# Patient Record
Sex: FEMALE | Race: WHITE | NOT HISPANIC OR LATINO | Employment: PART TIME | URBAN - METROPOLITAN AREA
[De-identification: names, ages, dates, MRNs, and addresses within clinical notes are randomized per-mention and may not be internally consistent; named-entity substitution may affect disease eponyms.]

---

## 2018-10-09 ENCOUNTER — OFFICE VISIT (OUTPATIENT)
Dept: FAMILY MEDICINE CLINIC | Facility: CLINIC | Age: 58
End: 2018-10-09
Payer: COMMERCIAL

## 2018-10-09 VITALS
BODY MASS INDEX: 32.03 KG/M2 | HEART RATE: 82 BPM | WEIGHT: 180.75 LBS | DIASTOLIC BLOOD PRESSURE: 70 MMHG | SYSTOLIC BLOOD PRESSURE: 102 MMHG | OXYGEN SATURATION: 96 % | HEIGHT: 63 IN | TEMPERATURE: 98 F

## 2018-10-09 DIAGNOSIS — Z23 NEED FOR INFLUENZA VACCINATION: ICD-10-CM

## 2018-10-09 DIAGNOSIS — Z13.220 SCREENING, LIPID: ICD-10-CM

## 2018-10-09 DIAGNOSIS — Z00.00 ROUTINE ADULT HEALTH MAINTENANCE: Primary | ICD-10-CM

## 2018-10-09 DIAGNOSIS — Z13.29 SCREENING FOR THYROID DISORDER: ICD-10-CM

## 2018-10-09 DIAGNOSIS — Z13.1 SCREENING FOR DIABETES MELLITUS: ICD-10-CM

## 2018-10-09 DIAGNOSIS — Z12.39 BREAST CANCER SCREENING: ICD-10-CM

## 2018-10-09 DIAGNOSIS — Z13.0 SCREENING, ANEMIA, DEFICIENCY, IRON: ICD-10-CM

## 2018-10-09 DIAGNOSIS — Z12.11 COLON CANCER SCREENING: ICD-10-CM

## 2018-10-09 PROCEDURE — 90471 IMMUNIZATION ADMIN: CPT

## 2018-10-09 PROCEDURE — 90682 RIV4 VACC RECOMBINANT DNA IM: CPT

## 2018-10-09 PROCEDURE — 99386 PREV VISIT NEW AGE 40-64: CPT | Performed by: FAMILY MEDICINE

## 2018-10-09 RX ORDER — BUTALBITAL, ACETAMINOPHEN AND CAFFEINE 50; 325; 40 MG/1; MG/1; MG/1
1-2 TABLET ORAL EVERY 4 HOURS PRN
COMMUNITY
Start: 2012-12-28 | End: 2018-10-09

## 2018-10-09 RX ORDER — ACETAMINOPHEN AND CODEINE PHOSPHATE 300; 30 MG/1; MG/1
1 TABLET ORAL
COMMUNITY
Start: 2013-08-09 | End: 2018-10-09

## 2018-10-09 NOTE — PATIENT INSTRUCTIONS

## 2018-10-09 NOTE — PROGRESS NOTES
Subjective     Kevin Adams is a 66 y o   female and is here for routine health maintenance  The patient reports no problems  History of Present Illness     HPI    Well Adult Physical   Patient here for a comprehensive physical exam       Diet and Physical Activity  Diet: well balanced diet  Weight concerns: Patient has class 1 obesity (BMI 30-34  9)  Exercise: infrequently      Depression Screen  PHQ-9 Depression Screening    PHQ-9:    Frequency of the following problems over the past two weeks:       Little interest or pleasure in doing things:  0 - not at all  Feeling down, depressed, or hopeless:  0 - not at all  PHQ-2 Score:  0          General Health  Hearing: Normal:  bilateral  Vision: no vision problems  Dental: regular dental visits and brushes teeth twice daily     History:  Menopause at 48 years  : 2  Para: 2    Cancer Screening  Colononoscopy - never had one, no fam hx  Mammogram - last one was years ago, poor experience   Pap - many years ago, at least 4  Abnormal pap? no      The following portions of the patient's history were reviewed and updated as appropriate: allergies, current medications, past family history, past medical history, past social history, past surgical history and problem list     Review of Systems     Review of Systems   Constitutional: Negative for chills, fever and unexpected weight change  HENT: Negative for congestion, ear pain, hearing loss, postnasal drip, rhinorrhea and sore throat  Eyes: Negative for visual disturbance  Respiratory: Negative for cough and shortness of breath  Cardiovascular: Negative for chest pain  Gastrointestinal: Negative for abdominal pain, constipation and diarrhea  Genitourinary: Negative for difficulty urinating  Musculoskeletal: Negative for arthralgias and gait problem  Skin: Negative for rash  Neurological: Negative for light-headedness and headaches     Psychiatric/Behavioral: Negative for dysphoric mood and sleep disturbance  The patient is not nervous/anxious  Past Medical History     Past Medical History:   Diagnosis Date    Depression        Past Surgical History     Past Surgical History:   Procedure Laterality Date    NO PAST SURGERIES         Social History     Social History     Social History    Marital status: /Civil Union     Spouse name: N/A    Number of children: N/A    Years of education: N/A     Social History Main Topics    Smoking status: Never Smoker    Smokeless tobacco: Never Used    Alcohol use None    Drug use: Unknown    Sexual activity: Not Asked     Other Topics Concern    None     Social History Narrative    None       Family History     Family History   Problem Relation Age of Onset    Asthma Mother     Heart disease Mother     Diabetes Father         Mellitus    Pancreatic cancer Father        Current Medications     No current outpatient prescriptions on file  Allergies     No Known Allergies    Objective     /70   Pulse 82   Temp 98 °F (36 7 °C)   Ht 5' 2 5" (1 588 m)   Wt 82 kg (180 lb 12 oz)   SpO2 96%   BMI 32 53 kg/m²   Wt Readings from Last 3 Encounters:   10/09/18 82 kg (180 lb 12 oz)   08/09/13 77 8 kg (171 lb 8 oz)   12/28/12 79 4 kg (175 lb)     BP Readings from Last 3 Encounters:   10/09/18 102/70   08/09/13 101/80   12/28/12 115/70     Pulse Readings from Last 3 Encounters:   10/09/18 82   08/09/13 80     Body mass index is 32 53 kg/m²  Physical Exam   Constitutional: She is oriented to person, place, and time  She appears well-developed and well-nourished  HENT:   Head: Normocephalic and atraumatic  Right Ear: External ear normal    Left Ear: External ear normal    Nose: Nose normal    Mouth/Throat: Oropharynx is clear and moist    Eyes: Pupils are equal, round, and reactive to light  Conjunctivae and EOM are normal    Neck: Normal range of motion  Neck supple  No thyroid mass and no thyromegaly present     Cardiovascular: Normal rate, regular rhythm and normal heart sounds  Pulmonary/Chest: Effort normal and breath sounds normal    Abdominal: Soft  Normal appearance  There is no tenderness  Musculoskeletal: Normal range of motion  She exhibits no edema  Lymphadenopathy:     She has no cervical adenopathy  Right: No supraclavicular adenopathy present  Neurological: She is alert and oriented to person, place, and time  Skin: Skin is warm, dry and intact  Psychiatric: She has a normal mood and affect  Her behavior is normal  Judgment and thought content normal    Nursing note and vitals reviewed  No exam data present    Health Maintenance     Health Maintenance   Topic Date Due    CRC Screening: Colonoscopy  1960    PAP SMEAR  01/03/1983    MAMMOGRAM  07/06/2014    Depression Screening PHQ  10/09/2019    DTaP,Tdap,and Td Vaccines (2 - Td) 08/09/2023    INFLUENZA VACCINE  Completed     Immunization History   Administered Date(s) Administered    H1N1, All Formulations 01/14/2010    Influenza, recombinant, quadrivalent,injectable, preservative free 10/09/2018    Tdap 08/09/2013                     Assessment/Plan         1  Healthy female exam   2  Patient Counseling:   · Nutrition: Stressed importance of a well balanced diet, moderation of sodium/saturated fat, caloric balance and sufficient intake of fiber  · Exercise: Stressed the importance of regular exercise with a goal of 150 minutes per week  · Dental Health: Discussed daily flossing and brushing and regular dental visits     · Immunizations reviewed  · Discussed benefits of screening   · Discussed the patient's BMI with her  The BMI is above average - the patient is generally eating a healthy diet but does not exercise much given her lack of time  I did encourage exercise and healthy diet  3  Cancer Screening - not UTD - needs pap, colonoscopy and mammo    She was given orders for colonoscopy and mammogram and I did advise she make an appointment for a Pap either with me or an OBGYN  4  Labs - ordered, also overdue  5    The patient has not been seen by any doctor in many years  She had a very poor experience with a mammogram in 2013 in which she had a biopsy that was done by a radiologist at [facility] and she was having a very hard time getting information or results from anybody  After that she basically stop seen doctors in general   She never went back to her OBGYN or came to see us here  She is overdue for all routine care because of this  I am hoping we can make this a more pleasant experience for her and advised that she get her mammograms her [facility] at which time I will see the results quickly and let her know what they are   6  Follow up next physical in 1 year      Dionisoi Mariee MD

## 2018-11-09 ENCOUNTER — OFFICE VISIT (OUTPATIENT)
Dept: FAMILY MEDICINE CLINIC | Facility: CLINIC | Age: 58
End: 2018-11-09
Payer: COMMERCIAL

## 2018-11-09 VITALS
DIASTOLIC BLOOD PRESSURE: 78 MMHG | HEART RATE: 94 BPM | WEIGHT: 180.5 LBS | HEIGHT: 63 IN | TEMPERATURE: 98 F | BODY MASS INDEX: 31.98 KG/M2 | SYSTOLIC BLOOD PRESSURE: 104 MMHG | OXYGEN SATURATION: 98 %

## 2018-11-09 DIAGNOSIS — F41.9 ANXIETY: ICD-10-CM

## 2018-11-09 DIAGNOSIS — Z12.11 COLON CANCER SCREENING: ICD-10-CM

## 2018-11-09 DIAGNOSIS — E03.9 ACQUIRED HYPOTHYROIDISM: ICD-10-CM

## 2018-11-09 DIAGNOSIS — F51.5 NIGHTMARES: Primary | ICD-10-CM

## 2018-11-09 PROCEDURE — 1036F TOBACCO NON-USER: CPT | Performed by: FAMILY MEDICINE

## 2018-11-09 PROCEDURE — 3008F BODY MASS INDEX DOCD: CPT | Performed by: FAMILY MEDICINE

## 2018-11-09 PROCEDURE — 99214 OFFICE O/P EST MOD 30 MIN: CPT | Performed by: FAMILY MEDICINE

## 2018-11-09 RX ORDER — LEVOTHYROXINE SODIUM 0.03 MG/1
25 TABLET ORAL DAILY
Qty: 30 TABLET | Refills: 1 | Status: SHIPPED | OUTPATIENT
Start: 2018-11-09 | End: 2018-12-23 | Stop reason: SDUPTHER

## 2018-11-09 RX ORDER — LORAZEPAM 0.5 MG/1
0.5 TABLET ORAL EVERY 8 HOURS PRN
Qty: 20 TABLET | Refills: 0 | Status: SHIPPED | OUTPATIENT
Start: 2018-11-09 | End: 2019-10-22 | Stop reason: CLARIF

## 2018-11-09 NOTE — ASSESSMENT & PLAN NOTE
This is a new diagnosis for the patient  I am going to go ahead and start her on levothyroxine 25 mcg  We will recheck the TSH in six weeks and adjust the dose as needed

## 2018-11-09 NOTE — ASSESSMENT & PLAN NOTE
I am absolutely writing a letter for the patient to be excused from jury duty  I definitely feel that this would have a significantly bad effect on her mental health  It sounds like she is almost suffering from PTSD from her experience and this would most certainly worsen with another Federal case  In addition, I am prescribing her some lorazepam to take if she is having trouble sleeping at night or if she has nightmares/panic attacks  More than likely when she know she does not have to partake in jury duty a lot of her anxiety should hopefully resolve

## 2018-11-09 NOTE — PATIENT INSTRUCTIONS
Breathing Techniques   WHAT YOU NEED TO KNOW:   Breathing techniques help slow your breathing and lower anxiety  These techniques help you breathe in more oxygen and breathe out carbon dioxide  Breathing techniques also help improve the function of your diaphragm and other muscles used for breathing  Practice breathing techniques at least 4 times per day  Practice them lying down, sitting, and walking  DISCHARGE INSTRUCTIONS:   Pursed-lip breathing:  Pursed-lip breathing is used to slow down your breathing rate  Breathe in through your nose for 2 seconds  Slowly breathe out through your mouth with your lips pursed for 4 seconds  Breathing out for 4 seconds helps get rid of carbon dioxide  You can also practice this breathing pattern while you bend, lift, climb stairs, or exercise  It slows down your breathing and helps move more air in and out of your lungs  · Pursed-lip breathing while walking:  Inhale while taking 2 steps  Exhale through pursed lips while taking 4 steps  Stop and rest if you become short of breath  · Pursed-lip breathing while climbing stairs:  Inhale through your nose while standing still  Exhale through pursed lips while climbing 1 or 2 stairs  Repeat until you are at the top  Stop and rest if you become short of breath  Abdominal breathing:  Abdominal breathing is also called diaphragmatic breathing  This type of breathing helps control your breathing and strengthen your diaphragm  Your diaphragm is the main muscle you use when you breathe  You will be able to do this type of breathing during activities with practice  · Lie on your back with pillows under your head and knees  If you cannot lie on your back, try sitting up in a chair  · Put one hand on your abdomen and one on your upper chest      · Breathe in slowly through your nose  Your abdomen should move out, and your chest should be as still as possible       · Tighten, or pull, your stomach muscles in toward your back as you breathe out slowly through pursed lips  Your chest should continue to remain as still as possible  Tips for breathing easier:   · If prescribed, use oxygen as directed  You may need to use it when you do activities such as bathing, eating, and dressing  · Sleep with your head elevated with many pillows or sleep in a recliner  · Lean forward while sitting with your elbows on a table or on your knees  · Relax by listening to soft music, getting a massage, or imagining a quiet place  · Continue physical activity for strength and endurance  Learn how to plan your activities to conserve your energy  © 2017 [employer]  Information is for End User's use only and may not be sold, redistributed or otherwise used for commercial purposes  All illustrations and images included in CareNotes® are the copyrighted property of [employer]  or [employer]  The above information is an  only  It is not intended as medical advice for individual conditions or treatments  Talk to your doctor, nurse or pharmacist before following any medical regimen to see if it is safe and effective for you  Hypothyroidism   AMBULATORY CARE:   Hypothyroidism  is a condition that develops when the thyroid gland does not make enough thyroid hormone  Thyroid hormones help control body temperature, heart rate, growth, and weight  Common signs and symptoms include the following: The signs and symptoms may develop slowly, sometimes over several years  · Exhaustion    · Sensitivity to cold    · Headaches or decreased concentration    · Muscle aches or weakness    · Constipation     · Dry, flaky skin or brittle nails    · Thinning hair    · Heavy or irregular monthly periods    · Depression or irritability  Call 911 for any of the following:   · You have sudden chest pain or shortness of breath  · You have a seizure  · You feel like you are going to faint    Seek care immediately if:   · You have diarrhea, tremors, or trouble sleeping  · Your legs, ankles, or feet are swollen  Contact your healthcare provider if:   · You have a fever  · You have chills, a cough, or feel weak and achy  · You have pain and swelling in your muscles and joints  · Your skin is itchy, swollen, or you have a rash  · Your signs and symptoms return or get worse, even after treatment  · You have questions or concerns about your condition or care  Treatment:  Thyroid hormone replacement medicine may bring your thyroid hormone level back to normal  Ask your healthcare provider for more information on other medicines you may need  Follow up with your healthcare provider as directed: You may need to return for more blood tests to check your thyroid hormone level  This will show if you are getting the right amount of thyroid medicine  Write down your questions so you remember to ask them during your visits  © 2017 [employer]  Information is for End User's use only and may not be sold, redistributed or otherwise used for commercial purposes  All illustrations and images included in CareNotes® are the copyrighted property of [employer]  or [employer]  The above information is an  only  It is not intended as medical advice for individual conditions or treatments  Talk to your doctor, nurse or pharmacist before following any medical regimen to see if it is safe and effective for you

## 2018-11-09 NOTE — PROGRESS NOTES
Subjective:   Chief Complaint   Patient presents with    Insomnia     PT RECEIVED FEDERAL JURY DUTY NOTICE FOR END OF NOVEMBER  PT HAD BAD EXPERIENCE AND NIGHTMARES AFTER  BEING INVOLVED IN A CASE IN THE PAST   PT FEELS SHE IS UNABLE TO DO THIS AGAIN  PT HAVING NIGHTMARES   PT HAD BLOODWORK DONE YESTERDAY  MAMMO SCHEDULED FOR NEXT WEEK  Patient ID: Stella Manzano is a 66 y o  female  The patient is here today because she is hoping I will write her a note for Federal jury duty  This is the 2nd time that she has been called  The last time she was actually called for a case that involved child trafficking  During this case she felt that the suspect was treated better than the yours  She was school did by the , advised she needed to put her feelings as a mother aside  She has significant stress and anxiety because of the time she spent on this Federal jury  Since she got the letter that she was called again she has been waking up with nightmares every night   she is having panic attacks  She has a history of anxiety but is not on medication    Additionally, she had lab work done yesterday  We receive most of the lab results  Everything is normal except for an elevated TSH  She does report fatigue  She does report that she is working to lose weight nothing is coming off  She does say she is generally not been feeling well lately  She has no personal or family history of thyroid disease as far she knows        The following portions of the patient's history were reviewed and updated as appropriate: allergies, current medications, past family history, past medical history, past social history, past surgical history and problem list     Review of Systems      Objective:  Vitals:    11/09/18 1334   BP: 104/78   Pulse: 94   Temp: 98 °F (36 7 °C)   SpO2: 98%   Weight: 81 9 kg (180 lb 8 oz)   Height: 5' 2 5" (1 588 m)      Physical Exam   Constitutional: She is oriented to person, place, and time  She appears well-developed and well-nourished  No distress  Neck: Normal range of motion  Neck supple  No thyromegaly present  Neurological: She is alert and oriented to person, place, and time  No cranial nerve deficit  Coordination normal    Skin: Skin is warm and dry  No rash noted  She is not diaphoretic  No erythema  Psychiatric: Her behavior is normal  Judgment and thought content normal  Her mood appears anxious  Her affect is blunt  Her affect is not labile and not inappropriate  Assessment/Plan:    Acquired hypothyroidism  This is a new diagnosis for the patient  I am going to go ahead and start her on levothyroxine 25 mcg  We will recheck the TSH in six weeks and adjust the dose as needed  Anxiety  I am absolutely writing a letter for the patient to be excused from jury duty  I definitely feel that this would have a significantly bad effect on her mental health  It sounds like she is almost suffering from PTSD from her experience and this would most certainly worsen with another Federal case  In addition, I am prescribing her some lorazepam to take if she is having trouble sleeping at night or if she has nightmares/panic attacks  More than likely when she know she does not have to partake in jury duty a lot of her anxiety should hopefully resolve  Diagnoses and all orders for this visit:    Nightmares  -     LORazepam (ATIVAN) 0 5 mg tablet; Take 1 tablet (0 5 mg total) by mouth every 8 (eight) hours as needed for anxiety    Anxiety  -     LORazepam (ATIVAN) 0 5 mg tablet; Take 1 tablet (0 5 mg total) by mouth every 8 (eight) hours as needed for anxiety    Acquired hypothyroidism  -     levothyroxine 25 mcg tablet; Take 1 tablet (25 mcg total) by mouth daily  -     TSH, 3rd generation with Free T4 reflex;  Future    Colon cancer screening

## 2018-11-09 NOTE — LETTER
Date: 11/9/2018    To whom it may concern: This is to certify that Judson Lovell has been under my care for the following diagnosis: PTSD/Anxiety    I feel that she is unable to serve on [address] at this time for the above mentioned medical reasons        Participant #[id number]        Sincerely,   Christianne Mario MD

## 2018-11-10 LAB
ALBUMIN SERPL-MCNC: 4.8 G/DL (ref 3.5–5.5)
ALBUMIN/GLOB SERPL: 1.7 {RATIO} (ref 1.2–2.2)
ALP SERPL-CCNC: 104 IU/L (ref 39–117)
ALT SERPL-CCNC: 17 IU/L (ref 0–32)
AST SERPL-CCNC: 22 IU/L (ref 0–40)
BASOPHILS # BLD AUTO: 0 X10E3/UL (ref 0–0.2)
BASOPHILS NFR BLD AUTO: 1 %
BILIRUB SERPL-MCNC: 0.4 MG/DL (ref 0–1.2)
BUN SERPL-MCNC: 11 MG/DL (ref 6–24)
BUN/CREAT SERPL: 14 (ref 9–23)
CALCIUM SERPL-MCNC: 9.7 MG/DL (ref 8.7–10.2)
CHLORIDE SERPL-SCNC: 104 MMOL/L (ref 96–106)
CHOLEST SERPL-MCNC: 167 MG/DL (ref 100–199)
CHOLEST/HDLC SERPL: 3 RATIO (ref 0–4.4)
CO2 SERPL-SCNC: 22 MMOL/L (ref 20–29)
CREAT SERPL-MCNC: 0.79 MG/DL (ref 0.57–1)
EOSINOPHIL # BLD AUTO: 0.1 X10E3/UL (ref 0–0.4)
EOSINOPHIL NFR BLD AUTO: 1 %
ERYTHROCYTE [DISTWIDTH] IN BLOOD BY AUTOMATED COUNT: 13.4 % (ref 12.3–15.4)
GLOBULIN SER-MCNC: 2.8 G/DL (ref 1.5–4.5)
GLUCOSE SERPL-MCNC: 97 MG/DL (ref 65–99)
HCT VFR BLD AUTO: 41.2 % (ref 34–46.6)
HDLC SERPL-MCNC: 55 MG/DL
HGB BLD-MCNC: 14.2 G/DL (ref 11.1–15.9)
IMM GRANULOCYTES # BLD: 0 X10E3/UL (ref 0–0.1)
IMM GRANULOCYTES NFR BLD: 0 %
LDLC SERPL CALC-MCNC: 92 MG/DL (ref 0–99)
LYMPHOCYTES # BLD AUTO: 1.8 X10E3/UL (ref 0.7–3.1)
LYMPHOCYTES NFR BLD AUTO: 33 %
MCH RBC QN AUTO: 31.6 PG (ref 26.6–33)
MCHC RBC AUTO-ENTMCNC: 34.5 G/DL (ref 31.5–35.7)
MCV RBC AUTO: 92 FL (ref 79–97)
MONOCYTES # BLD AUTO: 0.4 X10E3/UL (ref 0.1–0.9)
MONOCYTES NFR BLD AUTO: 8 %
NEUTROPHILS # BLD AUTO: 3.2 X10E3/UL (ref 1.4–7)
NEUTROPHILS NFR BLD AUTO: 57 %
PLATELET # BLD AUTO: 204 X10E3/UL (ref 150–379)
POTASSIUM SERPL-SCNC: 4.1 MMOL/L (ref 3.5–5.2)
PROT SERPL-MCNC: 7.6 G/DL (ref 6–8.5)
RBC # BLD AUTO: 4.49 X10E6/UL (ref 3.77–5.28)
SL AMB EGFR AFRICAN AMERICAN: 97 ML/MIN/1.73
SL AMB EGFR NON AFRICAN AMERICAN: 84 ML/MIN/1.73
SL AMB T4, FREE (DIRECT): 1.03 NG/DL (ref 0.82–1.77)
SL AMB VLDL CHOLESTEROL CALC: 20 MG/DL (ref 5–40)
SODIUM SERPL-SCNC: 141 MMOL/L (ref 134–144)
TRIGL SERPL-MCNC: 100 MG/DL (ref 0–149)
TSH SERPL DL<=0.005 MIU/L-ACNC: 9.87 UIU/ML (ref 0.45–4.5)
WBC # BLD AUTO: 5.6 X10E3/UL (ref 3.4–10.8)

## 2018-12-20 ENCOUNTER — TELEPHONE (OUTPATIENT)
Dept: OTHER | Facility: OTHER | Age: 58
End: 2018-12-20

## 2018-12-20 ENCOUNTER — TELEPHONE (OUTPATIENT)
Dept: FAMILY MEDICINE CLINIC | Facility: CLINIC | Age: 58
End: 2018-12-20

## 2018-12-20 DIAGNOSIS — E03.9 HYPOTHYROIDISM, UNSPECIFIED TYPE: Primary | ICD-10-CM

## 2018-12-20 NOTE — TELEPHONE ENCOUNTER
Patient calmed herself down and called back  She is okay now  She called her insurance and they told [facility], so [facility]  One of her issues [facility] her so she does not want to go back there  She is going to come  a hard copy of the script today to make sure there are no issues this time  I believe this issue may be resolved for now

## 2018-12-20 NOTE — TELEPHONE ENCOUNTER
Received tearful and angry phone call this morning from the patient  She is extremely upset stating that she went to [facility] this morning to have her TSH level drawn and that they stated they did not have her orders and refused to draw her blood  Patient also stated that she has felt much worse ever since taking the thyroid medication  I tried to calm the patient down, but she just became more frustrated and stated that she is d/c her thyroid medicine, she is not getting her blood work, and she is not coming back to our office because she feels that this is all too much for her to handle and feels that our office should be doing all the footwork for her because she doesn't have time  I did offer the patient several options to try and help but she refused all of them  This is just an Citizen of [place]  Please advise if you would like anything done further with this patient

## 2018-12-20 NOTE — TELEPHONE ENCOUNTER
Robel I am not sure if you want to reach out to this patient or not  I had ordered a repeat TSH but it looks like it was ordered to [address], clearly my mistake  I wish they would have called our office as it could have been put in while she was there  She has a lot of anxiety - I would be happy to see her in the office to discuss this all if she would like

## 2018-12-23 DIAGNOSIS — E03.9 ACQUIRED HYPOTHYROIDISM: ICD-10-CM

## 2018-12-23 LAB — TSH SERPL-ACNC: 4.26 MIU/L (ref 0.4–4.5)

## 2018-12-24 RX ORDER — LEVOTHYROXINE SODIUM 0.03 MG/1
25 TABLET ORAL DAILY
Qty: 30 TABLET | Refills: 0 | Status: SHIPPED | OUTPATIENT
Start: 2018-12-24 | End: 2018-12-27 | Stop reason: SDUPTHER

## 2018-12-24 NOTE — TELEPHONE ENCOUNTER
From: Rock Almaraz  Sent: 12/23/2018 9:27 AM EST  Subject: Medication Renewal Request    Rock Almaraz would like a refill of the following medications:     levothyroxine 25 mcg tablet Scott Nunez MD]    Preferred pharmacy: [facility]/PHARMACY #[id number] : [id number]

## 2018-12-27 DIAGNOSIS — E03.9 ACQUIRED HYPOTHYROIDISM: ICD-10-CM

## 2018-12-27 RX ORDER — LEVOTHYROXINE SODIUM 0.03 MG/1
25 TABLET ORAL DAILY
Qty: 30 TABLET | Refills: 0 | Status: SHIPPED | OUTPATIENT
Start: 2018-12-27 | End: 2018-12-31 | Stop reason: SDUPTHER

## 2018-12-31 DIAGNOSIS — E03.9 ACQUIRED HYPOTHYROIDISM: ICD-10-CM

## 2018-12-31 RX ORDER — LEVOTHYROXINE SODIUM 0.03 MG/1
25 TABLET ORAL DAILY
Qty: 90 TABLET | Refills: 3 | Status: SHIPPED | OUTPATIENT
Start: 2018-12-31 | End: 2019-10-22 | Stop reason: SDUPTHER

## 2018-12-31 NOTE — PROGRESS NOTES
Please call [facility] and let them know that we are cancelling the 30 day prescription of levothyroxine as she needs it sent to her mail order, which I did

## 2019-01-18 ENCOUNTER — HOSPITAL ENCOUNTER (OUTPATIENT)
Dept: MAMMOGRAPHY | Facility: CLINIC | Age: 59
Discharge: HOME | End: 2019-01-18

## 2019-01-22 ENCOUNTER — HOSPITAL ENCOUNTER (OUTPATIENT)
Dept: MAMMOGRAPHY | Facility: HOSPITAL | Age: 59
Discharge: HOME | End: 2019-01-22
Payer: COMMERCIAL

## 2019-01-22 VITALS — WEIGHT: 180 LBS | HEIGHT: 63 IN | BODY MASS INDEX: 31.89 KG/M2

## 2019-01-22 DIAGNOSIS — Z12.39 BREAST CANCER SCREENING: ICD-10-CM

## 2019-01-22 PROCEDURE — 77063 BREAST TOMOSYNTHESIS BI: CPT

## 2019-01-22 PROCEDURE — 77067 SCR MAMMO BI INCL CAD: CPT

## 2019-03-22 ENCOUNTER — TELEPHONE (OUTPATIENT)
Dept: FAMILY MEDICINE CLINIC | Facility: CLINIC | Age: 59
End: 2019-03-22

## 2019-03-22 NOTE — TELEPHONE ENCOUNTER
Called pt and asked her about the message she received  Pt states it was a message on [employer] that stated there was a referral in the system  I checked the pt's chart and there was no referral placed since October of 2018  Pt is going to disregard message

## 2019-03-22 NOTE — TELEPHONE ENCOUNTER
Regarding: Non-Urgent Medical Question  Contact: [phone]  ----- Message from [address] [place] [address] [address], Generic sent at 3/22/2019 12:14 PM EDT -----    Received letter from office regarding a referral and not sure what it is for as I have not contacted anyone   Please call my cellphone [phone]  Thanks,   Anna Shetty

## 2019-07-31 ENCOUNTER — TELEPHONE (OUTPATIENT)
Dept: FAMILY MEDICINE CLINIC | Facility: CLINIC | Age: 59
End: 2019-07-31

## 2019-07-31 NOTE — TELEPHONE ENCOUNTER
Please call the patient and schedule a routine physical/health maintenance visit any time after 10/9/2019

## 2019-09-30 ENCOUNTER — IMMUNIZATIONS (OUTPATIENT)
Dept: FAMILY MEDICINE CLINIC | Facility: CLINIC | Age: 59
End: 2019-09-30
Payer: COMMERCIAL

## 2019-09-30 DIAGNOSIS — Z23 ENCOUNTER FOR IMMUNIZATION: ICD-10-CM

## 2019-09-30 PROCEDURE — 90686 IIV4 VACC NO PRSV 0.5 ML IM: CPT

## 2019-09-30 PROCEDURE — 90471 IMMUNIZATION ADMIN: CPT

## 2019-10-22 ENCOUNTER — OFFICE VISIT (OUTPATIENT)
Dept: FAMILY MEDICINE CLINIC | Facility: CLINIC | Age: 59
End: 2019-10-22
Payer: COMMERCIAL

## 2019-10-22 VITALS
HEIGHT: 63 IN | WEIGHT: 175 LBS | DIASTOLIC BLOOD PRESSURE: 72 MMHG | TEMPERATURE: 97.6 F | SYSTOLIC BLOOD PRESSURE: 100 MMHG | OXYGEN SATURATION: 99 % | BODY MASS INDEX: 31.01 KG/M2 | HEART RATE: 85 BPM

## 2019-10-22 DIAGNOSIS — Z13.1 SCREENING FOR DIABETES MELLITUS: ICD-10-CM

## 2019-10-22 DIAGNOSIS — E66.9 OBESITY (BMI 30.0-34.9): ICD-10-CM

## 2019-10-22 DIAGNOSIS — Z13.220 SCREENING FOR LIPID DISORDERS: ICD-10-CM

## 2019-10-22 DIAGNOSIS — Z11.59 ENCOUNTER FOR HEPATITIS C SCREENING TEST FOR LOW RISK PATIENT: ICD-10-CM

## 2019-10-22 DIAGNOSIS — Z12.11 SCREEN FOR COLON CANCER: ICD-10-CM

## 2019-10-22 DIAGNOSIS — F51.5 NIGHTMARES: ICD-10-CM

## 2019-10-22 DIAGNOSIS — E03.9 ACQUIRED HYPOTHYROIDISM: Primary | ICD-10-CM

## 2019-10-22 DIAGNOSIS — Z13.0 SCREENING, ANEMIA, DEFICIENCY, IRON: ICD-10-CM

## 2019-10-22 DIAGNOSIS — Z12.39 SCREENING FOR BREAST CANCER: ICD-10-CM

## 2019-10-22 DIAGNOSIS — E03.9 ACQUIRED HYPOTHYROIDISM: ICD-10-CM

## 2019-10-22 PROCEDURE — 36415 COLL VENOUS BLD VENIPUNCTURE: CPT | Performed by: FAMILY MEDICINE

## 2019-10-22 PROCEDURE — 3008F BODY MASS INDEX DOCD: CPT | Performed by: FAMILY MEDICINE

## 2019-10-22 PROCEDURE — 99214 OFFICE O/P EST MOD 30 MIN: CPT | Performed by: FAMILY MEDICINE

## 2019-10-22 RX ORDER — LEVOTHYROXINE SODIUM 0.05 MG/1
25 TABLET ORAL DAILY
Qty: 90 TABLET | Refills: 0 | Status: SHIPPED | OUTPATIENT
Start: 2019-10-22 | End: 2019-10-23 | Stop reason: SDUPTHER

## 2019-10-22 NOTE — ASSESSMENT & PLAN NOTE
The patient continues with symptoms of hypothyroidism  I am going to increase her dosage of levothyroxine from 25 mcg to 50 mcg  As we did last year we discussed that generally people feel better with a TSH closer to 1 and her TSH was a little over 4 last December  She already had her blood drawn today to recheck the TSH  I am going to have everything rechecked in six weeks including all of her routine screening labs
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
## 2019-10-22 NOTE — PATIENT INSTRUCTIONS
Obesity   AMBULATORY CARE:   Obesity  is when your body mass index (BMI) is greater than 30  Your healthcare provider will use your height and weight to measure your BMI  The risks of obesity include  many health problems, such as injuries or physical disability  You may need tests to check for the following:  · Diabetes     · High blood pressure or high cholesterol     · Heart disease     · Gallbladder or liver disease     · Cancer of the colon, breast, prostate, liver, or kidney     · Sleep apnea     · Arthritis or gout  Seek care immediately if:   · You have a severe headache, confusion, or difficulty speaking  · You have weakness on one side of your body  · You have chest pain, sweating, or shortness of breath  Contact your healthcare provider if:   · You have symptoms of gallbladder or liver disease, such as pain in your upper abdomen  · You have knee or hip pain and discomfort while walking  · You have symptoms of diabetes, such as intense hunger and thirst, and frequent urination  · You have symptoms of sleep apnea, such as snoring or daytime sleepiness  · You have questions or concerns about your condition or care  Treatment for obesity  focuses on helping you lose weight to improve your health  Even a small decrease in BMI can reduce the risk for many health problems  Your healthcare provider will help you set a weight-loss goal   · Lifestyle changes  are the first step in treating obesity  These include making healthy food choices and getting regular physical activity  Your healthcare provider may suggest a weight-loss program that involves coaching, education, and therapy  · Medicine  may help you lose weight when it is used with a healthy diet and physical activity  · Surgery  can help you lose weight if you are very obese and have other health problems  There are several types of weight-loss surgery  Ask your healthcare provider for more information    Be successful losing weight:   · Set small, realistic goals  An example of a small goal is to walk for 20 minutes 5 days a week  Anther goal is to lose 5% of your body weight  · Tell friends, family members, and coworkers about your goals  and ask for their support  Ask a friend to lose weight with you, or join a weight-loss support group  · Identify foods or triggers that may cause you to overeat , and find ways to avoid them  Remove tempting high-calorie foods from your home and workplace  Place a bowl of fresh fruit on your kitchen counter  If stress causes you to eat, then find other ways to cope with stress  · Keep a diary to track what you eat and drink  Also write down how many minutes of physical activity you do each day  Weigh yourself once a week and record it in your diary  Eating changes: You will need to eat 500 to 1,000 fewer calories each day than you currently eat to lose 1 to 2 pounds a week  The following changes will help you cut calories:  · Eat smaller portions  Use small plates, no larger than 9 inches in diameter  Fill your plate half full of fruits and vegetables  Measure your food using measuring cups until you know what a serving size looks like  · Eat 3 meals and 1 or 2 snacks each day  Plan your meals in advance  Blair Christensen and eat at home most of the time  Eat slowly  · Eat fruits and vegetables at every meal   They are low in calories and high in fiber, which makes you feel full  Do not add butter, margarine, or cream sauce to vegetables  Use herbs to season steamed vegetables  · Eat less fat and fewer fried foods  Eat more baked or grilled chicken and fish  These protein sources are lower in calories and fat than red meat  Limit fast food  Dress your salads with olive oil and vinegar instead of bottled dressing  · Limit the amount of sugar you eat  Do not drink sugary beverages  Limit alcohol  Activity changes:  Physical activity is good for your body in many ways   It helps you burn calories and build strong muscles  It decreases stress and depression, and improves your mood  It can also help you sleep better  Talk to your healthcare provider before you begin an exercise program   · Exercise for at least 30 minutes 5 days a week  Start slowly  Set aside time each day for physical activity that you enjoy and that is convenient for you  It is best to do both weight training and an activity that increases your heart rate, such as walking, bicycling, or swimming  · Find ways to be more active  Do yard work and housecleaning  Walk up the stairs instead of using elevators  Spend your leisure time going to events that require walking, such as outdoor festivals or fairs  This extra physical activity can help you lose weight and keep it off  Follow up with your healthcare provider as directed: You may need to meet with a dietitian  Write down your questions so you remember to ask them during your visits  © 2017 [employer] Information is for End User's use only and may not be sold, redistributed or otherwise used for commercial purposes  All illustrations and images included in CareNotes® are the copyrighted property of [employer] M [employer]  or [employer]  The above information is an  only  It is not intended as medical advice for individual conditions or treatments  Talk to your doctor, nurse or pharmacist before following any medical regimen to see if it is safe and effective for you

## 2019-10-23 DIAGNOSIS — E03.9 ACQUIRED HYPOTHYROIDISM: ICD-10-CM

## 2019-10-23 LAB — TSH SERPL DL<=0.005 MIU/L-ACNC: 5.9 UIU/ML (ref 0.45–4.5)

## 2019-10-23 RX ORDER — LEVOTHYROXINE SODIUM 0.05 MG/1
50 TABLET ORAL DAILY
Qty: 90 TABLET | Refills: 0 | Status: SHIPPED | OUTPATIENT
Start: 2019-10-23 | End: 2019-12-27 | Stop reason: SDUPTHER

## 2019-10-23 RX ORDER — LEVOTHYROXINE SODIUM 0.03 MG/1
TABLET ORAL
Qty: 90 TABLET | Refills: 3 | OUTPATIENT
Start: 2019-10-23

## 2019-10-23 NOTE — TELEPHONE ENCOUNTER
Pt would like her levothyroxine sent to [facility], phone # is [phone] fax # [phone] ORDER # [id number], look like this med was called into [facility] yesterday

## 2019-11-26 ENCOUNTER — TELEPHONE (OUTPATIENT)
Dept: FAMILY MEDICINE CLINIC | Facility: CLINIC | Age: 59
End: 2019-11-26

## 2019-11-26 NOTE — TELEPHONE ENCOUNTER
----- Message from Beverly Mariano sent at 11/25/2019  5:33 PM EST -----  Regarding: FW: Test Results Question  Contact: [phone]      ----- Message -----  From: Michele Crespo  Sent: 11/22/2019   2:15 PM EST  To: [facility]  Subject: Test Results Question                            I have a question about COLOGUARD resulted on 11/13/19 at 11:08 AM  Please call me on my cellphone [phone]   Thanks, Germaine Johnson

## 2019-12-05 ENCOUNTER — CLINICAL SUPPORT (OUTPATIENT)
Dept: FAMILY MEDICINE CLINIC | Facility: CLINIC | Age: 59
End: 2019-12-05
Payer: COMMERCIAL

## 2019-12-05 DIAGNOSIS — Z13.1 SCREENING FOR DIABETES MELLITUS: ICD-10-CM

## 2019-12-05 DIAGNOSIS — Z13.0 SCREENING FOR ENDOCRINE, METABOLIC AND IMMUNITY DISORDER: ICD-10-CM

## 2019-12-05 DIAGNOSIS — Z13.0 SCREENING, ANEMIA, DEFICIENCY, IRON: ICD-10-CM

## 2019-12-05 DIAGNOSIS — E03.9 ACQUIRED HYPOTHYROIDISM: Primary | ICD-10-CM

## 2019-12-05 DIAGNOSIS — Z13.29 SCREENING FOR ENDOCRINE, METABOLIC AND IMMUNITY DISORDER: ICD-10-CM

## 2019-12-05 DIAGNOSIS — Z13.228 SCREENING FOR ENDOCRINE, METABOLIC AND IMMUNITY DISORDER: ICD-10-CM

## 2019-12-05 DIAGNOSIS — Z13.220 SCREENING FOR LIPID DISORDERS: ICD-10-CM

## 2019-12-05 PROCEDURE — 36415 COLL VENOUS BLD VENIPUNCTURE: CPT

## 2019-12-06 LAB
ALBUMIN SERPL-MCNC: 4.2 G/DL (ref 3.5–5.5)
ALBUMIN/GLOB SERPL: 1.6 {RATIO} (ref 1.2–2.2)
ALP SERPL-CCNC: 80 IU/L (ref 39–117)
ALT SERPL-CCNC: 15 IU/L (ref 0–32)
AST SERPL-CCNC: 15 IU/L (ref 0–40)
BASOPHILS # BLD AUTO: 0 X10E3/UL (ref 0–0.2)
BASOPHILS NFR BLD AUTO: 0 %
BILIRUB SERPL-MCNC: 0.4 MG/DL (ref 0–1.2)
BUN SERPL-MCNC: 13 MG/DL (ref 6–24)
BUN/CREAT SERPL: 20 (ref 9–23)
CALCIUM SERPL-MCNC: 9 MG/DL (ref 8.7–10.2)
CHLORIDE SERPL-SCNC: 106 MMOL/L (ref 96–106)
CHOLEST SERPL-MCNC: 147 MG/DL (ref 100–199)
CO2 SERPL-SCNC: 21 MMOL/L (ref 20–29)
CREAT SERPL-MCNC: 0.66 MG/DL (ref 0.57–1)
EOSINOPHIL # BLD AUTO: 0.1 X10E3/UL (ref 0–0.4)
EOSINOPHIL NFR BLD AUTO: 1 %
ERYTHROCYTE [DISTWIDTH] IN BLOOD BY AUTOMATED COUNT: 13.6 % (ref 12.3–15.4)
GLOBULIN SER-MCNC: 2.6 G/DL (ref 1.5–4.5)
GLUCOSE SERPL-MCNC: 95 MG/DL (ref 65–99)
HCT VFR BLD AUTO: 40 % (ref 34–46.6)
HDLC SERPL-MCNC: 53 MG/DL
HGB BLD-MCNC: 13.3 G/DL (ref 11.1–15.9)
IMM GRANULOCYTES # BLD: 0 X10E3/UL (ref 0–0.1)
IMM GRANULOCYTES NFR BLD: 0 %
LDLC SERPL CALC-MCNC: 79 MG/DL (ref 0–99)
LDLC/HDLC SERPL: 1.5 RATIO (ref 0–3.2)
LYMPHOCYTES # BLD AUTO: 1.5 X10E3/UL (ref 0.7–3.1)
LYMPHOCYTES NFR BLD AUTO: 20 %
MCH RBC QN AUTO: 31.1 PG (ref 26.6–33)
MCHC RBC AUTO-ENTMCNC: 33.3 G/DL (ref 31.5–35.7)
MCV RBC AUTO: 94 FL (ref 79–97)
MONOCYTES # BLD AUTO: 0.6 X10E3/UL (ref 0.1–0.9)
MONOCYTES NFR BLD AUTO: 8 %
NEUTROPHILS # BLD AUTO: 5.5 X10E3/UL (ref 1.4–7)
NEUTROPHILS NFR BLD AUTO: 71 %
PLATELET # BLD AUTO: 187 X10E3/UL (ref 150–450)
POTASSIUM SERPL-SCNC: 3.9 MMOL/L (ref 3.5–5.2)
PROT SERPL-MCNC: 6.8 G/DL (ref 6–8.5)
RBC # BLD AUTO: 4.28 X10E6/UL (ref 3.77–5.28)
SL AMB EGFR AFRICAN AMERICAN: 113 ML/MIN/1.73
SL AMB EGFR NON AFRICAN AMERICAN: 98 ML/MIN/1.73
SL AMB VLDL CHOLESTEROL CALC: 15 MG/DL (ref 5–40)
SODIUM SERPL-SCNC: 141 MMOL/L (ref 134–144)
TRIGL SERPL-MCNC: 77 MG/DL (ref 0–149)
TSH SERPL DL<=0.005 MIU/L-ACNC: 3.41 UIU/ML (ref 0.45–4.5)
WBC # BLD AUTO: 7.8 X10E3/UL (ref 3.4–10.8)

## 2019-12-27 DIAGNOSIS — E03.9 ACQUIRED HYPOTHYROIDISM: ICD-10-CM

## 2019-12-30 RX ORDER — LEVOTHYROXINE SODIUM 0.05 MG/1
TABLET ORAL
Qty: 90 TABLET | Refills: 0 | Status: SHIPPED | OUTPATIENT
Start: 2019-12-30 | End: 2020-01-22 | Stop reason: SDUPTHER

## 2020-01-02 ENCOUNTER — OFFICE VISIT (OUTPATIENT)
Dept: FAMILY MEDICINE CLINIC | Facility: CLINIC | Age: 60
End: 2020-01-02
Payer: COMMERCIAL

## 2020-01-02 VITALS
SYSTOLIC BLOOD PRESSURE: 102 MMHG | TEMPERATURE: 98.3 F | BODY MASS INDEX: 30.74 KG/M2 | HEIGHT: 63 IN | OXYGEN SATURATION: 98 % | DIASTOLIC BLOOD PRESSURE: 68 MMHG | HEART RATE: 87 BPM | WEIGHT: 173.5 LBS

## 2020-01-02 DIAGNOSIS — R05.8 POST-VIRAL COUGH SYNDROME: Primary | ICD-10-CM

## 2020-01-02 PROCEDURE — 99213 OFFICE O/P EST LOW 20 MIN: CPT | Performed by: FAMILY MEDICINE

## 2020-01-02 PROCEDURE — 3008F BODY MASS INDEX DOCD: CPT | Performed by: FAMILY MEDICINE

## 2020-01-02 RX ORDER — BENZONATATE 200 MG/1
200 CAPSULE ORAL 3 TIMES DAILY PRN
Qty: 30 CAPSULE | Refills: 0 | Status: SHIPPED | OUTPATIENT
Start: 2020-01-02 | End: 2021-05-21 | Stop reason: ALTCHOICE

## 2020-01-02 NOTE — PATIENT INSTRUCTIONS
Acute Bronchitis   WHAT YOU NEED TO KNOW:   Acute bronchitis is swelling and irritation in the air passages of your lungs  This irritation may cause you to cough or have other breathing problems  Acute bronchitis often starts because of another illness, such as a cold or the flu  The illness spreads from your nose and throat to your windpipe and airways  Bronchitis is often called a chest cold  Acute bronchitis lasts about 3 to 6 weeks and is usually not a serious illness  Your cough can last for several weeks  DISCHARGE INSTRUCTIONS:   Return to the emergency department if:   · You cough up blood  · Your lips or fingernails turn blue  · You feel like you are not getting enough air when you breathe  Contact your healthcare provider if:   · You have a fever  · Your breathing problems do not go away or get worse  · Your cough does not get better within 4 weeks  · You have questions or concerns about your condition or care  Self-care:   · Get more rest   Rest helps your body to heal  Slowly start to do more each day  Rest when you feel it is needed  · Avoid irritants in the air  Avoid chemicals, fumes, and dust  Wear a face mask if you must work around dust or fumes  Stay inside on days when air pollution levels are high  If you have allergies, stay inside when pollen counts are high  Do not use aerosol products, such as spray-on deodorant, bug spray, and hair spray  · Do not smoke or be around others who smoke  Nicotine and other chemicals in cigarettes and cigars damages the cilia that move mucus out of your lungs  Ask your healthcare provider for information if you currently smoke and need help to quit  E-cigarettes or smokeless tobacco still contain nicotine  Talk to your healthcare provider before you use these products  · Drink liquids as directed  Liquids help keep your air passages moist and help you cough up mucus   You may need to drink more liquids when you have acute bronchitis  Ask how much liquid to drink each day and which liquids are best for you  · Use a humidifier or vaporizer  Use a cool mist humidifier or a vaporizer to increase air moisture in your home  This may make it easier for you to breathe and help decrease your cough  Decrease risk for acute bronchitis:   · Get the vaccinations you need  Ask your healthcare provider if you should get vaccinated against the flu or pneumonia  · Prevent the spread of germs  You can decrease your risk of acute bronchitis and other illnesses by doing the following:     [facility] your hands often with soap and water  Carry germ-killing hand lotion or gel with you  You can use the lotion or gel to clean your hands when soap and water are not available  ¨ Do not touch your eyes, nose, or mouth unless you have washed your hands first     ¨ Always cover your mouth when you cough to prevent the spread of germs  It is best to cough into a tissue or your shirt sleeve instead of into your hand  Ask those around you cover their mouths when they cough  ¨ Try to avoid people who have a cold or the flu  If you are sick, stay away from others as much as possible  Medicines: Your healthcare provider may  give you any of the following:  · Ibuprofen or acetaminophen  are medicines that help lower your fever  They are available without a doctor's order  Ask your healthcare provider which medicine is right for you  Ask how much to take and how often to take it  Follow directions  These medicines can cause stomach bleeding if not taken correctly  Ibuprofen can cause kidney damage  Do not take ibuprofen if you have kidney disease, an ulcer, or allergies to aspirin  Acetaminophen can cause liver damage  Do not take more than 4,000 milligrams in 24 hours  · Decongestants  help loosen mucus in your lungs and make it easier to cough up  This can help you breathe easier  · Cough suppressants  decrease your urge to cough   If your cough produces mucus, do not take a cough suppressant unless your healthcare provider tells you to  Your healthcare provider may suggest that you take a cough suppressant at night so you can rest     · Inhalers  may be given  Your healthcare provider may give you one or more inhalers to help you breathe easier and cough less  An inhaler gives your medicine to open your airways  Ask your healthcare provider to show you how to use your inhaler correctly  · Take your medicine as directed  Contact your healthcare provider if you think your medicine is not helping or if you have side effects  Tell him of her if you are allergic to any medicine  Keep a list of the medicines, vitamins, and herbs you take  Include the amounts, and when and why you take them  Bring the list or the pill bottles to follow-up visits  Carry your medicine list with you in case of an emergency  Follow up with your healthcare provider as directed:  Write down questions you have so you will remember to ask them during your follow-up visits  © 2017 [employer] Information is for End User's use only and may not be sold, redistributed or otherwise used for commercial purposes  All illustrations and images included in CareNotes® are the copyrighted property of [employer]  or [employer]xton Cruz  The above information is an  only  It is not intended as medical advice for individual conditions or treatments  Talk to your doctor, nurse or pharmacist before following any medical regimen to see if it is safe and effective for you

## 2020-01-02 NOTE — PROGRESS NOTES
Subjective:   Chief Complaint   Patient presents with    Cough     Pt is here today with a dry cough for the past 2 weeks  Pt tried OTC meds and nothing has been working  FBW compelte 12-5-19  Hep C screening due at next blood draw  Mammo order given to pt  Pt aware she needs to schedule PAP  Annual exam due  Patient ID: Epi Sanders is a 64 y o  female  The pt is here today because she has been coughing for 2 weeks  She has not really felt sick  She coughs a lot in the morning when she first gets up  She will get into fits during the day brought on by talking  No f/c  She had some sinus pressure and congestion when this first started but it's gone now         The following portions of the patient's history were reviewed and updated as appropriate: allergies, current medications, past family history, past medical history, past social history, past surgical history and problem list     Review of Systems          Objective:  Vitals:    01/02/20 1555   BP: 102/68   Pulse: 87   Temp: 98 3 °F (36 8 °C)   SpO2: 98%   Weight: 78 7 kg (173 lb 8 oz)   Height: 5' 2 5" (1 588 m)      Physical Exam   Constitutional: Vital signs are normal  She appears well-developed and well-nourished  She does not appear ill  HENT:   Head: Normocephalic and atraumatic  Right Ear: Tympanic membrane, external ear and ear canal normal    Left Ear: Tympanic membrane, external ear and ear canal normal    Nose: Rhinorrhea present  Right sinus exhibits no maxillary sinus tenderness and no frontal sinus tenderness  Left sinus exhibits no maxillary sinus tenderness and no frontal sinus tenderness  Mouth/Throat: Mucous membranes are normal  Posterior oropharyngeal erythema present  No oropharyngeal exudate or posterior oropharyngeal edema  Eyes: Conjunctivae, EOM and lids are normal    Pulmonary/Chest: Effort normal and breath sounds normal    Lymphadenopathy:     She has no cervical adenopathy     Skin: Skin is warm, dry and intact  Assessment/Plan:    No problem-specific Assessment & Plan notes found for this encounter  Diagnoses and all orders for this visit:    Post-viral cough syndrome  -     benzonatate (TESSALON) 200 MG capsule; Take 1 capsule (200 mg total) by mouth 3 (three) times a day as needed for cough        We discussed that she is no longer "sick" or contagious but that this cough can last for weeks  I did rx meds for sx relief

## 2020-01-22 DIAGNOSIS — E03.9 ACQUIRED HYPOTHYROIDISM: ICD-10-CM

## 2020-01-22 RX ORDER — LEVOTHYROXINE SODIUM 0.05 MG/1
50 TABLET ORAL DAILY
Qty: 90 TABLET | Refills: 1 | Status: SHIPPED | OUTPATIENT
Start: 2020-01-22 | End: 2020-01-23 | Stop reason: SDUPTHER

## 2020-01-23 DIAGNOSIS — E03.9 ACQUIRED HYPOTHYROIDISM: ICD-10-CM

## 2020-01-23 RX ORDER — LEVOTHYROXINE SODIUM 0.05 MG/1
50 TABLET ORAL DAILY
Qty: 90 TABLET | Refills: 1 | Status: SHIPPED | OUTPATIENT
Start: 2020-01-23 | End: 2020-08-07 | Stop reason: SDUPTHER

## 2020-02-10 ENCOUNTER — TRANSCRIBE ORDERS (OUTPATIENT)
Dept: ADMINISTRATIVE | Facility: HOSPITAL | Age: 60
End: 2020-02-10

## 2020-02-10 DIAGNOSIS — Z12.31 SCREENING MAMMOGRAM, ENCOUNTER FOR: Primary | ICD-10-CM

## 2020-02-10 DIAGNOSIS — Z12.31 SCREENING MAMMOGRAM FOR HIGH-RISK PATIENT: Primary | ICD-10-CM

## 2020-02-11 ENCOUNTER — HOSPITAL ENCOUNTER (OUTPATIENT)
Dept: MAMMOGRAPHY | Facility: HOSPITAL | Age: 60
Discharge: HOME | End: 2020-02-11
Payer: COMMERCIAL

## 2020-02-11 VITALS — WEIGHT: 173 LBS | BODY MASS INDEX: 30.65 KG/M2 | HEIGHT: 63 IN

## 2020-02-11 DIAGNOSIS — Z12.31 SCREENING MAMMOGRAM FOR HIGH-RISK PATIENT: ICD-10-CM

## 2020-02-11 PROCEDURE — 77063 BREAST TOMOSYNTHESIS BI: CPT

## 2020-02-11 PROCEDURE — 77067 SCR MAMMO BI INCL CAD: CPT

## 2020-07-24 DIAGNOSIS — E03.9 ACQUIRED HYPOTHYROIDISM: Primary | ICD-10-CM

## 2020-08-06 ENCOUNTER — CLINICAL SUPPORT (OUTPATIENT)
Dept: FAMILY MEDICINE CLINIC | Facility: CLINIC | Age: 60
End: 2020-08-06
Payer: COMMERCIAL

## 2020-08-06 DIAGNOSIS — E03.9 ACQUIRED HYPOTHYROIDISM: Primary | ICD-10-CM

## 2020-08-06 PROCEDURE — 36415 COLL VENOUS BLD VENIPUNCTURE: CPT

## 2020-08-07 DIAGNOSIS — E03.9 ACQUIRED HYPOTHYROIDISM: ICD-10-CM

## 2020-08-07 LAB
T3FREE SERPL-MCNC: 3 PG/ML (ref 2–4.4)
T4 FREE SERPL-MCNC: 1.38 NG/DL (ref 0.82–1.77)
TSH SERPL DL<=0.005 MIU/L-ACNC: 2.17 UIU/ML (ref 0.45–4.5)

## 2020-08-07 RX ORDER — LEVOTHYROXINE SODIUM 0.05 MG/1
50 TABLET ORAL DAILY
Qty: 90 TABLET | Refills: 0 | Status: SHIPPED | OUTPATIENT
Start: 2020-08-07 | End: 2020-10-16

## 2020-10-15 DIAGNOSIS — E03.9 ACQUIRED HYPOTHYROIDISM: ICD-10-CM

## 2020-10-16 RX ORDER — LEVOTHYROXINE SODIUM 0.05 MG/1
TABLET ORAL
Qty: 90 TABLET | Refills: 3 | Status: SHIPPED | OUTPATIENT
Start: 2020-10-16 | End: 2021-05-14 | Stop reason: DRUGHIGH

## 2020-10-24 ENCOUNTER — IMMUNIZATIONS (OUTPATIENT)
Dept: FAMILY MEDICINE CLINIC | Facility: CLINIC | Age: 60
End: 2020-10-24
Payer: COMMERCIAL

## 2020-10-24 DIAGNOSIS — Z23 NEED FOR VACCINATION: Primary | ICD-10-CM

## 2020-10-24 PROCEDURE — 90471 IMMUNIZATION ADMIN: CPT

## 2020-10-24 PROCEDURE — 90682 RIV4 VACC RECOMBINANT DNA IM: CPT

## 2021-03-29 DIAGNOSIS — Z23 ENCOUNTER FOR IMMUNIZATION: ICD-10-CM

## 2021-04-13 ENCOUNTER — TELEPHONE (OUTPATIENT)
Dept: FAMILY MEDICINE CLINIC | Facility: CLINIC | Age: 61
End: 2021-04-13

## 2021-04-13 NOTE — TELEPHONE ENCOUNTER
Please call the patient and schedule a routine physical/health maintenance visit  The patient will  need bloodwork prior to the visit  Please schedule a covid vaccine if the patient has not yet had it

## 2021-04-15 DIAGNOSIS — Z11.59 ENCOUNTER FOR HEPATITIS C SCREENING TEST FOR LOW RISK PATIENT: ICD-10-CM

## 2021-04-15 DIAGNOSIS — Z13.0 SCREENING, ANEMIA, DEFICIENCY, IRON: ICD-10-CM

## 2021-04-15 DIAGNOSIS — Z13.220 SCREENING FOR LIPID DISORDERS: ICD-10-CM

## 2021-04-15 DIAGNOSIS — E03.9 ACQUIRED HYPOTHYROIDISM: Primary | ICD-10-CM

## 2021-04-15 DIAGNOSIS — Z13.1 SCREENING FOR DIABETES MELLITUS: ICD-10-CM

## 2021-04-17 ENCOUNTER — IMMUNIZATIONS (OUTPATIENT)
Dept: FAMILY MEDICINE CLINIC | Facility: HOSPITAL | Age: 61
End: 2021-04-17

## 2021-04-17 DIAGNOSIS — Z23 ENCOUNTER FOR IMMUNIZATION: Primary | ICD-10-CM

## 2021-04-17 PROCEDURE — 91300 SARS-COV-2 / COVID-19 MRNA VACCINE (PFIZER-BIONTECH) 30 MCG: CPT

## 2021-04-17 PROCEDURE — 0001A SARS-COV-2 / COVID-19 MRNA VACCINE (PFIZER-BIONTECH) 30 MCG: CPT

## 2021-05-11 ENCOUNTER — IMMUNIZATIONS (OUTPATIENT)
Dept: FAMILY MEDICINE CLINIC | Facility: HOSPITAL | Age: 61
End: 2021-05-11

## 2021-05-11 DIAGNOSIS — Z23 ENCOUNTER FOR IMMUNIZATION: Primary | ICD-10-CM

## 2021-05-11 PROCEDURE — 91300 SARS-COV-2 / COVID-19 MRNA VACCINE (PFIZER-BIONTECH) 30 MCG: CPT

## 2021-05-11 PROCEDURE — 0002A SARS-COV-2 / COVID-19 MRNA VACCINE (PFIZER-BIONTECH) 30 MCG: CPT

## 2021-05-13 ENCOUNTER — CLINICAL SUPPORT (OUTPATIENT)
Dept: FAMILY MEDICINE CLINIC | Facility: CLINIC | Age: 61
End: 2021-05-13
Payer: COMMERCIAL

## 2021-05-13 DIAGNOSIS — Z13.0 SCREENING FOR ENDOCRINE, NUTRITIONAL, METABOLIC AND IMMUNITY DISORDER: ICD-10-CM

## 2021-05-13 DIAGNOSIS — Z13.220 SCREENING FOR LIPID DISORDERS: ICD-10-CM

## 2021-05-13 DIAGNOSIS — E03.9 ACQUIRED HYPOTHYROIDISM: Primary | ICD-10-CM

## 2021-05-13 DIAGNOSIS — Z13.0 SCREENING FOR IRON DEFICIENCY ANEMIA: ICD-10-CM

## 2021-05-13 DIAGNOSIS — Z11.59 NEED FOR HEPATITIS C SCREENING TEST: ICD-10-CM

## 2021-05-13 DIAGNOSIS — Z13.21 SCREENING FOR ENDOCRINE, NUTRITIONAL, METABOLIC AND IMMUNITY DISORDER: ICD-10-CM

## 2021-05-13 DIAGNOSIS — Z13.29 SCREENING FOR ENDOCRINE, NUTRITIONAL, METABOLIC AND IMMUNITY DISORDER: ICD-10-CM

## 2021-05-13 DIAGNOSIS — Z13.228 SCREENING FOR ENDOCRINE, NUTRITIONAL, METABOLIC AND IMMUNITY DISORDER: ICD-10-CM

## 2021-05-13 PROCEDURE — 36415 COLL VENOUS BLD VENIPUNCTURE: CPT

## 2021-05-14 ENCOUNTER — TELEPHONE (OUTPATIENT)
Dept: FAMILY MEDICINE CLINIC | Facility: CLINIC | Age: 61
End: 2021-05-14

## 2021-05-14 DIAGNOSIS — E03.9 ACQUIRED HYPOTHYROIDISM: Primary | ICD-10-CM

## 2021-05-14 LAB
ALBUMIN SERPL-MCNC: 4.1 G/DL (ref 3.8–4.9)
ALBUMIN/GLOB SERPL: 1.5 {RATIO} (ref 1.2–2.2)
ALP SERPL-CCNC: 94 IU/L (ref 39–117)
ALT SERPL-CCNC: 12 IU/L (ref 0–32)
AST SERPL-CCNC: 17 IU/L (ref 0–40)
BASOPHILS # BLD AUTO: 0 X10E3/UL (ref 0–0.2)
BASOPHILS NFR BLD AUTO: 1 %
BILIRUB SERPL-MCNC: 0.4 MG/DL (ref 0–1.2)
BUN SERPL-MCNC: 10 MG/DL (ref 6–24)
BUN/CREAT SERPL: 13 (ref 9–23)
CALCIUM SERPL-MCNC: 9.1 MG/DL (ref 8.7–10.2)
CHLORIDE SERPL-SCNC: 108 MMOL/L (ref 96–106)
CHOLEST SERPL-MCNC: 163 MG/DL (ref 100–199)
CO2 SERPL-SCNC: 21 MMOL/L (ref 20–29)
CREAT SERPL-MCNC: 0.76 MG/DL (ref 0.57–1)
EOSINOPHIL # BLD AUTO: 0.1 X10E3/UL (ref 0–0.4)
EOSINOPHIL NFR BLD AUTO: 3 %
ERYTHROCYTE [DISTWIDTH] IN BLOOD BY AUTOMATED COUNT: 12.6 % (ref 11.7–15.4)
GLOBULIN SER-MCNC: 2.8 G/DL (ref 1.5–4.5)
GLUCOSE SERPL-MCNC: 95 MG/DL (ref 65–99)
HCT VFR BLD AUTO: 41.6 % (ref 34–46.6)
HCV AB S/CO SERPL IA: <0.1 S/CO RATIO (ref 0–0.9)
HDLC SERPL-MCNC: 50 MG/DL
HGB BLD-MCNC: 13.6 G/DL (ref 11.1–15.9)
IMM GRANULOCYTES # BLD: 0 X10E3/UL (ref 0–0.1)
IMM GRANULOCYTES NFR BLD: 0 %
LDLC SERPL CALC-MCNC: 95 MG/DL (ref 0–99)
LDLC/HDLC SERPL: 1.9 RATIO (ref 0–3.2)
LYMPHOCYTES # BLD AUTO: 1 X10E3/UL (ref 0.7–3.1)
LYMPHOCYTES NFR BLD AUTO: 23 %
MCH RBC QN AUTO: 30.6 PG (ref 26.6–33)
MCHC RBC AUTO-ENTMCNC: 32.7 G/DL (ref 31.5–35.7)
MCV RBC AUTO: 94 FL (ref 79–97)
MONOCYTES # BLD AUTO: 0.6 X10E3/UL (ref 0.1–0.9)
MONOCYTES NFR BLD AUTO: 14 %
NEUTROPHILS # BLD AUTO: 2.6 X10E3/UL (ref 1.4–7)
NEUTROPHILS NFR BLD AUTO: 59 %
PLATELET # BLD AUTO: 169 X10E3/UL (ref 150–450)
POTASSIUM SERPL-SCNC: 3.9 MMOL/L (ref 3.5–5.2)
PROT SERPL-MCNC: 6.9 G/DL (ref 6–8.5)
RBC # BLD AUTO: 4.44 X10E6/UL (ref 3.77–5.28)
SL AMB EGFR AFRICAN AMERICAN: 99 ML/MIN/1.73
SL AMB EGFR NON AFRICAN AMERICAN: 86 ML/MIN/1.73
SL AMB T4, FREE (DIRECT): 1.22 NG/DL (ref 0.82–1.77)
SL AMB VLDL CHOLESTEROL CALC: 18 MG/DL (ref 5–40)
SODIUM SERPL-SCNC: 141 MMOL/L (ref 134–144)
TRIGL SERPL-MCNC: 101 MG/DL (ref 0–149)
TSH SERPL DL<=0.005 MIU/L-ACNC: 5.8 UIU/ML (ref 0.45–4.5)
WBC # BLD AUTO: 4.4 X10E3/UL (ref 3.4–10.8)

## 2021-05-14 RX ORDER — LEVOTHYROXINE SODIUM 0.07 MG/1
75 TABLET ORAL
Qty: 90 TABLET | Refills: 3 | Status: SHIPPED | OUTPATIENT
Start: 2021-05-14 | End: 2022-06-01 | Stop reason: SDUPTHER

## 2021-05-14 NOTE — TELEPHONE ENCOUNTER
----- Message from Arslan Alamo DO sent at 5/14/2021  8:40 AM EDT -----  All of your labs look good except looks like you need a slightly higher dose of your thyroid supplement  We would increase to 75mcg and recheck in 4-6 weeks  How are you feeling?   Sent through [employer]

## 2021-05-14 NOTE — TELEPHONE ENCOUNTER
If she hasnt been feeling well, I would first adjust the dose of her thyroid medication  I sent the new dose to her mail away  I think she may be feeling better on the higher dose  I would move her appointment out a little further so she has a chance to try the increased dose to see how that is working unless she has other concerns to discuss

## 2021-05-14 NOTE — TELEPHONE ENCOUNTER
Pt informed  She states that she has not been feeling well lately  She had an appointment scheduled for Dr Jessica Mesa on 5/21 and I rescheduled her with you for the same day because she was supposed to discuss her results

## 2021-05-21 ENCOUNTER — OFFICE VISIT (OUTPATIENT)
Dept: FAMILY MEDICINE CLINIC | Facility: CLINIC | Age: 61
End: 2021-05-21
Payer: COMMERCIAL

## 2021-05-21 VITALS
TEMPERATURE: 96.7 F | SYSTOLIC BLOOD PRESSURE: 140 MMHG | OXYGEN SATURATION: 95 % | BODY MASS INDEX: 31.63 KG/M2 | WEIGHT: 178.5 LBS | HEART RATE: 97 BPM | DIASTOLIC BLOOD PRESSURE: 82 MMHG | HEIGHT: 63 IN

## 2021-05-21 DIAGNOSIS — E03.9 ACQUIRED HYPOTHYROIDISM: ICD-10-CM

## 2021-05-21 DIAGNOSIS — Z12.31 ENCOUNTER FOR SCREENING MAMMOGRAM FOR MALIGNANT NEOPLASM OF BREAST: ICD-10-CM

## 2021-05-21 DIAGNOSIS — Z00.00 WELL ADULT EXAM: Primary | ICD-10-CM

## 2021-05-21 PROCEDURE — 3008F BODY MASS INDEX DOCD: CPT | Performed by: FAMILY MEDICINE

## 2021-05-21 PROCEDURE — 1036F TOBACCO NON-USER: CPT | Performed by: FAMILY MEDICINE

## 2021-05-21 PROCEDURE — 99396 PREV VISIT EST AGE 40-64: CPT | Performed by: FAMILY MEDICINE

## 2021-05-21 PROCEDURE — 3725F SCREEN DEPRESSION PERFORMED: CPT | Performed by: FAMILY MEDICINE

## 2021-05-21 NOTE — PATIENT INSTRUCTIONS
CONTINUE LEVOTHYROXINE 75MCG 1 TAB DAILY   RECHECK 4-6 weeks   CONSIDER INCREASING DOSE WITH NEXT TSH LEVEL   SCHEDULE ENDOCRINE EVALUATION   Schedule pap   Schedule mammogram    Wellness Visit for Adults   AMBULATORY CARE:   A wellness visit  is when you see your healthcare provider to get screened for health problems  Your healthcare provider will also give you advice on how to stay healthy  Write down your questions so you remember to ask them  Ask your healthcare provider how often you should have a wellness visit  What happens at a wellness visit:  Your healthcare provider will ask about your health, and your family history of health problems  This includes high blood pressure, heart disease, and cancer  He or she will ask if you have symptoms that concern you, if you smoke, and about your mood  You may also be asked about your intake of medicines, supplements, food, and alcohol  Any of the following may be done:  · Your weight  will be checked  Your height may also be checked so your body mass index (BMI) can be calculated  Your BMI shows if you are at a healthy weight  · Your blood pressure  and heart rate will be checked  Your temperature may also be checked  · Blood and urine tests  may be done  Blood tests may be done to check your cholesterol levels  Abnormal cholesterol levels increase your risk for heart disease and stroke  You may also need a blood or urine test to check for diabetes if you are at increased risk  Urine tests may be done to look for signs of an infection or kidney disease  · A physical exam  includes checking your heartbeat and lungs with a stethoscope  Your healthcare provider may also check your skin to look for sun damage  · Screening tests  may be recommended  A screening test is done to check for diseases that may not cause symptoms  The screening tests you may need depend on your age, gender, family history, and lifestyle habits   For example, colorectal screening may be recommended if you are 48years old or older  Screening tests you need if you are a woman:   · A Pap smear  is used to screen for cervical cancer  Pap smears are usually done every 3 to 5 years depending on your age  You may need them more often if you have had abnormal Pap smear test results in the past  Ask your healthcare provider how often you should have a Pap smear  · A mammogram  is an x-ray of your breasts to screen for breast cancer  Experts recommend mammograms every 2 years starting at age 48 years  You may need a mammogram at age 52 years or younger if you have an increased risk for breast cancer  Talk to your healthcare provider about when you should start having mammograms and how often you need them  Vaccines you may need:   · Get an influenza vaccine  every year  The influenza vaccine protects you from the flu  Several types of viruses cause the flu  The viruses change over time, so new vaccines are made each year  · Get a tetanus-diphtheria (Td) booster vaccine  every 10 years  This vaccine protects you against tetanus and diphtheria  Tetanus is a severe infection that may cause painful muscle spasms and lockjaw  Diphtheria is a severe bacterial infection that causes a thick covering in the back of your mouth and throat  · Get a human papillomavirus (HPV) vaccine  if you are female and aged 23 to 32 or male 23 to 24 and never received it  This vaccine protects you from HPV infection  HPV is the most common infection spread by sexual contact  HPV may also cause vaginal, penile, and anal cancers  · Get a pneumococcal vaccine  if you are aged 72 years or older  The pneumococcal vaccine is an injection given to protect you from pneumococcal disease  Pneumococcal disease is an infection caused by pneumococcal bacteria  The infection may cause pneumonia, meningitis, or an ear infection  · Get a shingles vaccine  if you are 60 or older, even if you have had shingles before   The shingles vaccine is an injection to protect you from the varicella-zoster virus  This is the same virus that causes chickenpox  Shingles is a painful rash that develops in people who had chickenpox or have been exposed to the virus  How to eat healthy:  My Plate is a model for planning healthy meals  It shows the types and amounts of foods that should go on your plate  Fruits and vegetables make up about half of your plate, and grains and protein make up the other half  A serving of dairy is included on the side of your plate  The amount of calories and serving sizes you need depends on your age, gender, weight, and height  Examples of healthy foods are listed below:  · Eat a variety of vegetables  such as dark green, red, and orange vegetables  You can also include canned vegetables low in sodium (salt) and frozen vegetables without added butter or sauces  · Eat a variety of fresh fruits , canned fruit in 100% juice, frozen fruit, and dried fruit  · Include whole grains  At least half of the grains you eat should be whole grains  Examples include whole-wheat bread, wheat pasta, brown rice, and whole-grain cereals such as oatmeal     · Eat a variety of protein foods such as seafood (fish and shellfish), lean meat, and poultry without skin (turkey and chicken)  Examples of lean meats include pork leg, shoulder, or tenderloin, and beef round, sirloin, tenderloin, and extra lean ground beef  Other protein foods include eggs and egg substitutes, beans, peas, soy products, nuts, and seeds  · Choose low-fat dairy products such as skim or 1% milk or low-fat yogurt, cheese, and cottage cheese  · Limit unhealthy fats  such as butter, hard margarine, and shortening  Exercise:  Exercise at least 30 minutes per day on most days of the week  Some examples of exercise include walking, biking, dancing, and swimming   You can also fit in more physical activity by taking the stairs instead of the elevator or parking farther away from stores  Include muscle strengthening activities 2 days each week  Regular exercise provides many health benefits  It helps you manage your weight, and decreases your risk for type 2 diabetes, heart disease, stroke, and high blood pressure  Exercise can also help improve your mood  Ask your healthcare provider about the best exercise plan for you  General health and safety guidelines:   · Do not smoke  Nicotine and other chemicals in cigarettes and cigars can cause lung damage  Ask your healthcare provider for information if you currently smoke and need help to quit  E-cigarettes or smokeless tobacco still contain nicotine  Talk to your healthcare provider before you use these products  · Limit alcohol  A drink of alcohol is 12 ounces of beer, 5 ounces of wine, or 1½ ounces of liquor  · Lose weight, if needed  Being overweight increases your risk of certain health conditions  These include heart disease, high blood pressure, type 2 diabetes, and certain types of cancer  · Protect your skin  Do not sunbathe or use tanning beds  Use sunscreen with a SPF 15 or higher  Apply sunscreen at least 15 minutes before you go outside  Reapply sunscreen every 2 hours  Wear protective clothing, hats, and sunglasses when you are outside  · Drive safely  Always wear your seatbelt  Make sure everyone in your car wears a seatbelt  A seatbelt can save your life if you are in an accident  Do not use your cell phone when you are driving  This could distract you and cause an accident  Pull over if you need to make a call or send a text message  · Practice safe sex  Use latex condoms if are sexually active and have more than one partner  Your healthcare provider may recommend screening tests for sexually transmitted infections (STIs)  · Wear helmets, lifejackets, and protective gear    Always wear a helmet when you ride a bike or motorcycle, go skiing, or play sports that could cause a head injury  Wear protective equipment when you play sports  Wear a lifejacket when you are on a boat or doing water sports  © Copyright [address] Information is for End User's use only and may not be sold, redistributed or otherwise used for commercial purposes  All illustrations and images included in CareNotes® are the copyrighted property of A D A M , Inc  or [facility] Romy Srivastava   The above information is an  only  It is not intended as medical advice for individual conditions or treatments  Talk to your doctor, nurse or pharmacist before following any medical regimen to see if it is safe and effective for you

## 2021-05-21 NOTE — PROGRESS NOTES
Assessment/Plan:      1  Well adult exam    2  Acquired hypothyroidism  Assessment & Plan:  Increase dose of levothyroxine to 75mcg daily and recheck 4-6 weeks  Schedule with endocrine  Orders:  -     Ambulatory referral to Endocrinology; Future    3  Encounter for screening mammogram for malignant neoplasm of breast  -     Mammo screening bilateral w 3d & cad; Future; Expected date: 05/21/2021        Subjective:  Chief Complaint   Patient presents with    Annual Exam     PT COMES IN FOR YEARLY PHYSICAL AND REVIEW LABS   MAMMO ORDERS GIVEN   WILL SCHEDULE PAP  FRUSTRATED WITH THYROID ISSUES  Patient ID: Aleksey Jensen is a 63 y o  female  Pt is here for a physical today and review blood work  She COMPLAINS OF TEMPERATURE INTOLERANCE- ALWAYS FEEL COLD  She hasTROUBLE LOSING WEIGHT  She complains of HAIR LOSS  She has trouble with CONSTIPATION  NO PALPITATIONS  INTOLERANCE TO CERTAIN FOODS, ONIONS PEPPERS- gets reflux  COMPLAINS OF INCREASED STRESS AT WORK      Review of Systems   Constitutional: Positive for fatigue  Negative for fever  HENT: Negative  Eyes: Negative  Respiratory: Negative  Negative for cough  Cardiovascular: Negative  Gastrointestinal: Negative  Endocrine: Positive for cold intolerance  Genitourinary: Negative  Musculoskeletal: Negative  Skin: Negative  Allergic/Immunologic: Negative  Neurological: Negative  Psychiatric/Behavioral: Negative  The following portions of the patient's history were reviewed and updated as appropriate: allergies, current medications, past family history, past medical history, past social history, past surgical history and problem list     Objective:  Vitals:    05/21/21 0924   BP: 140/82   Pulse: 97   Temp: (!) 96 7 °F (35 9 °C)   SpO2: 95%   Weight: 81 kg (178 lb 8 oz)   Height: 5' 2 5" (1 588 m)      Physical Exam  Vitals signs and nursing note reviewed     Constitutional:       Appearance: She is well-developed  HENT:      Head: Normocephalic  Right Ear: External ear normal       Left Ear: External ear normal       Nose: Nose normal    Eyes:      Conjunctiva/sclera: Conjunctivae normal       Pupils: Pupils are equal, round, and reactive to light  Neck:      Musculoskeletal: Normal range of motion and neck supple  Cardiovascular:      Rate and Rhythm: Normal rate and regular rhythm  Heart sounds: Normal heart sounds  Pulmonary:      Effort: Pulmonary effort is normal       Breath sounds: Normal breath sounds  Abdominal:      General: Bowel sounds are normal       Palpations: Abdomen is soft  Skin:     General: Skin is warm and dry  Neurological:      Mental Status: She is alert and oriented to person, place, and time  Psychiatric:         Behavior: Behavior normal          Thought Content: Thought content normal          Judgment: Judgment normal        BMI Counseling: Body mass index is 32 13 kg/m²  The BMI is above normal  Nutrition recommendations include reducing portion sizes  Exercise recommendations include exercising 3-5 times per week

## 2021-06-16 ENCOUNTER — HOSPITAL ENCOUNTER (OUTPATIENT)
Dept: MAMMOGRAPHY | Facility: IMAGING CENTER | Age: 61
Discharge: HOME | End: 2021-06-16
Payer: COMMERCIAL

## 2021-06-16 VITALS — HEIGHT: 62 IN | WEIGHT: 178 LBS | BODY MASS INDEX: 32.76 KG/M2

## 2021-06-16 DIAGNOSIS — Z12.31 ENCOUNTER FOR SCREENING MAMMOGRAM FOR MALIGNANT NEOPLASM OF BREAST: ICD-10-CM

## 2021-06-16 PROCEDURE — 77063 BREAST TOMOSYNTHESIS BI: CPT

## 2021-06-16 PROCEDURE — 77067 SCR MAMMO BI INCL CAD: CPT

## 2021-06-27 PROBLEM — Z01.419 ENCOUNTER FOR ANNUAL ROUTINE GYNECOLOGICAL EXAMINATION: Status: ACTIVE | Noted: 2021-06-27

## 2021-06-28 ENCOUNTER — OFFICE VISIT (OUTPATIENT)
Dept: OBGYN CLINIC | Facility: CLINIC | Age: 61
End: 2021-06-28
Payer: COMMERCIAL

## 2021-06-28 VITALS — SYSTOLIC BLOOD PRESSURE: 112 MMHG | BODY MASS INDEX: 32.26 KG/M2 | DIASTOLIC BLOOD PRESSURE: 70 MMHG | WEIGHT: 176.4 LBS

## 2021-06-28 DIAGNOSIS — Z12.11 SCREENING FOR COLON CANCER: ICD-10-CM

## 2021-06-28 DIAGNOSIS — Z91.89 DES EXPOSURE IN UTERO: ICD-10-CM

## 2021-06-28 DIAGNOSIS — Z01.419 ENCOUNTER FOR ANNUAL ROUTINE GYNECOLOGICAL EXAMINATION: Primary | ICD-10-CM

## 2021-06-28 DIAGNOSIS — Z12.31 ENCOUNTER FOR SCREENING MAMMOGRAM FOR MALIGNANT NEOPLASM OF BREAST: ICD-10-CM

## 2021-06-28 DIAGNOSIS — Z12.4 SCREENING FOR CERVICAL CANCER: ICD-10-CM

## 2021-06-28 PROCEDURE — S0610 ANNUAL GYNECOLOGICAL EXAMINA: HCPCS | Performed by: OBSTETRICS & GYNECOLOGY

## 2021-06-28 PROCEDURE — G0145 SCR C/V CYTO,THINLAYER,RESCR: HCPCS | Performed by: OBSTETRICS & GYNECOLOGY

## 2021-06-28 PROCEDURE — G0476 HPV COMBO ASSAY CA SCREEN: HCPCS | Performed by: OBSTETRICS & GYNECOLOGY

## 2021-06-28 NOTE — PROGRESS NOTES
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    Encounter for screening mammogram for malignant neoplasm of breast  -     Cancel: Mammo screening bilateral w 3d & cad; Future  -     Mammo screening bilateral w 3d & cad; Future    Screening for colon cancer    Screening for cervical cancer  -     Liquid-based pap, screening              Subjective:        Patient ID: Aleksey Jensen is a 63 y o  female  Jeffersonvillerolando Bradfords presents today for a yearly evaluation  She has not been seen by gynecologist since her last visit with me possibly 8 years ago  She has no complaints  Menopause occurred around age 50 shortly after going off oral contraceptives  We will obtain her old records  She does have a history of in utero ALICIA exposure  The following portions of the patient's history were reviewed and updated as appropriate: She  has a past medical history of Acquired hypothyroidism (11/9/2018) and Depression    Patient Active Problem List    Diagnosis Date Noted    Encounter for annual routine gynecological examination 06/27/2021    Well adult exam 05/21/2021    Encounter for screening mammogram for malignant neoplasm of breast 05/21/2021    Nightmares 10/22/2019    Acquired hypothyroidism 11/09/2018   PMH:  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  Menarche 17  Primary Infertility- 6 years, 3 specialists then they gave up, conceived spontaneously  G2, P2; SAVD x 2- AMA, Placenta Previa resolved, M 40w 8# '99, M 4-12 37w, Precipitous, [place] '02  Menopause ~ 50, shortly after discontinuing OC's   Depression '16- reactive, related to son's health  Hypothyroidism '18  Nightmares '18- related to potential jury duty involving human trafficking  She  has a past surgical history that includes No past surgeries and Breast biopsy (Right, 07/29/2014)  Her family history includes Asthma in her mother; Diabetes in her father; Heart disease in her mother; No Known Problems in her paternal aunt, paternal aunt, son, and son; Pancreatic cancer (age of onset: 80) in her father  FH:  F- DM, d Pancreatic Ca '00 86  M- Scarlet Fever, Valvular Heart Surgery 82, d Asthma, Leukemia '12 at 82  Son- Anxiety '16  She  reports that she has never smoked  She has never used smokeless tobacco  She reports current alcohol use  She reports that she does not use drugs  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 [place] '89  I delivered Dagmar Dhaliwal and Yan López, in transit, precipitous]  Current Outpatient Medications   Medication Sig Dispense Refill    levothyroxine 75 mcg tablet Take 1 tablet (75 mcg total) by mouth daily in the early morning 90 tablet 3     No current facility-administered medications for this visit  Current Outpatient Medications on File Prior to Visit   Medication Sig    levothyroxine 75 mcg tablet Take 1 tablet (75 mcg total) by mouth daily in the early morning     No current facility-administered medications on file prior to visit  She has No Known Allergies       Review of Systems   Constitutional: Negative for activity change, appetite change, fatigue and unexpected weight change  Eyes: Negative for visual disturbance  Respiratory: Negative for cough, chest tightness, shortness of breath and wheezing  Cardiovascular: Negative for chest pain, palpitations and leg swelling  Breast: Patient denies tenderness, nipple discharge, masses, or erythema  Gastrointestinal: Negative for abdominal distention, abdominal pain, blood in stool, constipation, diarrhea, nausea and vomiting  Endocrine: Negative for cold intolerance and heat intolerance  Genitourinary: Negative for decreased urine volume, difficulty urinating, dysuria, frequency, hematuria, menstrual problem, pelvic pain, urgency, vaginal bleeding, vaginal discharge and vaginal pain  She is not sexually active   Musculoskeletal: Negative for arthralgias  Skin: Negative for rash  Neurological: Negative for weakness, light-headedness, numbness and headaches  Hematological: Does not bruise/bleed easily  Psychiatric/Behavioral: Negative for agitation, behavioral problems and sleep disturbance  The patient is not nervous/anxious  Objective:    Vitals:    06/28/21 1548   BP: 112/70   Weight: 80 kg (176 lb 6 4 oz)            Physical Exam  Vitals and nursing note reviewed  Constitutional:       General: She is not in acute distress  Appearance: She is well-developed  She is obese  HENT:      Head: Normocephalic and atraumatic  Eyes:      General: No scleral icterus  Right eye: No discharge  Left eye: No discharge  Extraocular Movements: Extraocular movements intact  Conjunctiva/sclera: Conjunctivae normal    Neck:      Thyroid: No thyromegaly  Trachea: No tracheal deviation  Cardiovascular:      Rate and Rhythm: Normal rate and regular rhythm  Heart sounds: Normal heart sounds  No murmur heard  Pulmonary:      Effort: Pulmonary effort is normal  No respiratory distress  Breath sounds: Normal breath sounds  No wheezing  Chest:      Breasts: Breasts are symmetrical          Right: No inverted nipple, mass, nipple discharge, skin change or tenderness  Left: No inverted nipple, mass, nipple discharge, skin change or tenderness     Abdominal: General: Bowel sounds are normal  There is no distension  Palpations: Abdomen is soft  There is no mass  Tenderness: There is no abdominal tenderness  There is no guarding or rebound  Genitourinary:     General: Normal vulva  Labia:         Right: No rash, tenderness or lesion  Left: No rash, tenderness or lesion  Vagina: Normal       Cervix: No cervical motion tenderness or discharge  Uterus: Not deviated, not enlarged and not tender  Adnexa:         Right: No mass, tenderness or fullness  Left: No mass, tenderness or fullness  Rectum: No external hemorrhoid  Comments: Urethral meatus within normal limits  Perineum within normal limits  Bladder well supported  Physiologic vaginal atrophy present  Musculoskeletal:         General: No tenderness  Normal range of motion  Cervical back: Normal range of motion and neck supple  Lymphadenopathy:      Cervical: No cervical adenopathy  Skin:     General: Skin is warm and dry  Neurological:      Mental Status: She is alert and oriented to person, place, and time  Psychiatric:         Mood and Affect: Mood normal          Behavior: Behavior normal          Thought Content:  Thought content normal          Judgment: Judgment normal

## 2021-06-30 LAB
HPV HR 12 DNA CVX QL NAA+PROBE: NEGATIVE
HPV16 DNA CVX QL NAA+PROBE: NEGATIVE
HPV18 DNA CVX QL NAA+PROBE: NEGATIVE

## 2021-07-01 LAB
LAB AP GYN PRIMARY INTERPRETATION: NORMAL
Lab: NORMAL

## 2021-08-13 ENCOUNTER — CONSULT (OUTPATIENT)
Dept: ENDOCRINOLOGY | Facility: HOSPITAL | Age: 61
End: 2021-08-13
Payer: COMMERCIAL

## 2021-08-13 VITALS
HEIGHT: 62 IN | SYSTOLIC BLOOD PRESSURE: 120 MMHG | DIASTOLIC BLOOD PRESSURE: 74 MMHG | WEIGHT: 174.8 LBS | BODY MASS INDEX: 32.17 KG/M2 | HEART RATE: 108 BPM

## 2021-08-13 DIAGNOSIS — E03.9 ACQUIRED HYPOTHYROIDISM: ICD-10-CM

## 2021-08-13 PROCEDURE — 99244 OFF/OP CNSLTJ NEW/EST MOD 40: CPT | Performed by: INTERNAL MEDICINE

## 2021-08-13 NOTE — PROGRESS NOTES
8/15/2021    Assessment/Plan      Diagnoses and all orders for this visit:    Acquired hypothyroidism  -     Ambulatory referral to Endocrinology  -     TSH, 3rd generation Lab Collect  -     T4, free Lab Collect  -     Thyroid Antibodies Panel Lab Collect  -     T3, free        Assessment/Plan:   Hypothyroidism  At this point, she has had a thyroid dosage adjustment several months ago and is due for repeat blood work  I will have her get her blood work done now and check for thyroid antibodies to see if she has an underlying autoimmune condition causing her hypothyroidism  She will call about a week after the blood work if we have not called her 1st   For now, she will continue the same levothyroxine 75 mcg daily  I will adjust her thyroid hormone if needed with this blood work results  Regardless of what the thyroid blood work shows, I really think she has significant depression which is contributing to a lot of her symptoms  I discussed this with her and I think she needs to follow-up with her primary care physician for possible treatment of her depression  Hypothyroidism can contribute to depression but given the severity of her depression, I do not think this is the cause of her symptoms  Some of her symptoms can be related to depression including fatigue and insomnia  I will do blood work now consisting of a TSH, free T4, free T3, and thyroid antibody panel  I have asked her to follow up based on what her blood work results show  This will be determined based on that  CC:   Hypothyroid consult    History of Present Illness     HPI: Chelita Campbell is a 63y o  year old female with history of  Hypothyroidism here for evaluation / consult  She reports that she was diagnosed with hypothyroidism in 2018 when she complained of severe fatigue, depression, and weight gain    Blood work was done in preparation for a flu shot and that is when her thyroid function tests were noted to be abnormal   She was started on levothyroxine 25 mcg daily  She did not feel well on this dose a more blood work was done showing continued hypothyroidism and dose was increased to 50 mcg daily  She has felt as if she kit just can not keep up with others and she is always cold at work  She has mental fatigue and is exhausted by the end of a work day  She was having trouble concentrating at work but admits she was under lot of stress at work doing the job of 2 or 3 people  She was unable to lose weight over the last year despite doing a special diet program   She was very depressed and continues to be as she was fired last week  She denies heat intolerance, palpitation, or tremors  She has insomnia and wakes frequently at night since the [facility] pandemic  She does have some constipation  She denies diarrhea or anxiety  She has some hair loss but no dry skin or brittle nails  She has no diplopia  She has no compressive thyroid symptoms or difficulties with swallowing  She has no history of head or neck irradiation in the past       Her levothyroxine dosage was increased in May 2021 to levothyroxine 75 mcg daily  She does report that she takes it on an empty stomach and wait at least half an hour or more to eat  She does not take any vitamins or herbal medications within 3-4 hours of when she takes her thyroid hormone dosage  She continues to have significant hypothyroid symptoms and depressive symptoms despite the increase in levothyroxine dosage  Of note she did talk to her work as she has a lot of stress 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 and working basically the hours for 2 people at minimum if not 3 people  Her son is also out [place] and her other son is now back to college so this gives her a lot of stress    She decided in mid July to decrease her hours to a 5 day a week of working 9:00 a m  to 1:00 p m  since this is the best time of the day when she feels the most confident  She unfortunately was fired last week as a result of this and is even more depressed now and feeling worthless  She felt may be some slight decrease in fatigue when she decreased her hours  Of note there is no family history of thyroid disease in the past but she does have a history of being a baby born of a mother who took in ALICIA  Review of Systems   Constitutional: Positive for fatigue and unexpected weight change  Try to lose weight with some weight program called naturally slim from February 2020 through February 2021  Would lose a few lb and then gained it right back so no significant change in weight  Exhausted at the end of a work day and very fatigued  HENT: Negative for hearing loss, tinnitus and trouble swallowing  No XRT to the head or neck in the past    Eyes: Negative for visual disturbance  No diplopia  Respiratory: Negative for chest tightness and shortness of breath  Cardiovascular: Negative for chest pain, palpitations and leg swelling  Gastrointestinal: Positive for constipation  Negative for abdominal pain, diarrhea and nausea  Occasional constipation  Endocrine: Positive for cold intolerance  Negative for heat intolerance  Tends to be cold after supper or when sitting in air conditioned room  Very cold at work when others are not although office is in the basement and quite cold  Musculoskeletal: Positive for arthralgias  Negative for back pain  Occasional right knee pain  Skin: Negative for rash  No dry skin  No brittle nails  Some hair loss  Neurological: Positive for headaches  Negative for dizziness, tremors, light-headedness and numbness  Occasional headaches  Psychiatric/Behavioral: Positive for dysphoric mood and sleep disturbance  The patient is not nervous/anxious  Wakes frequently at night, since COVID hit  Significant depression and feeling worthless    Significant stress between work and family issues  Felt sort of brain fog as if she just could not keep up with others mentally  Historical Information   Past Medical History:   Diagnosis Date    Acquired hypothyroidism 11/9/2018    Depression      Past Surgical History:   Procedure Laterality Date    BREAST BIOPSY Right 07/29/2014     core bx    TONSILLECTOMY      childhood     Social History   Social History     Substance and Sexual Activity   Alcohol Use Yes    Comment: occaional glass of wine     Social History     Substance and Sexual Activity   Drug Use Never     Social History     Tobacco Use   Smoking Status Never Smoker   Smokeless Tobacco Never Used     Family History:   Family History   Problem Relation Age of Onset    Asthma Mother     Heart disease Mother         scarlet fever induced valvular disease with valve replacement    Leukemia Mother     Valvular heart disease Mother     Diabetes Father         Mellitus    Pancreatic cancer Father 80    No Known Problems Son     No Known Problems Son     No Known Problems Paternal Aunt     No Known Problems Paternal Aunt        Meds/Allergies   Current Outpatient Medications   Medication Sig Dispense Refill    levothyroxine 75 mcg tablet Take 1 tablet (75 mcg total) by mouth daily in the early morning 90 tablet 3     No current facility-administered medications for this visit  No Known Allergies    Objective   Vitals: Blood pressure 120/74, pulse (!) 108, height 5' 2" (1 575 m), weight 79 3 kg (174 lb 12 8 oz)  Invasive Devices     None                 Physical Exam  Vitals reviewed  Constitutional:       Appearance: Normal appearance  She is well-developed  She is obese  HENT:      Head: Normocephalic and atraumatic  Eyes:      Extraocular Movements: Extraocular movements intact  Conjunctiva/sclera: Conjunctivae normal       Comments: No lid lag, stare, proptosis, or periorbital edema  Neck:      Thyroid: No thyromegaly  Vascular: No carotid bruit  Comments: Thyroid normal in size  No palpable thyroid nodules  No bruits over the thyroid gland  Cardiovascular:      Rate and Rhythm: Normal rate and regular rhythm  Heart sounds: Normal heart sounds  No murmur heard  Pulmonary:      Effort: Pulmonary effort is normal       Breath sounds: Normal breath sounds  No wheezing  Abdominal:      General: Bowel sounds are normal       Palpations: Abdomen is soft  Tenderness: There is no abdominal tenderness  Musculoskeletal:         General: No deformity  Normal range of motion  Cervical back: Normal range of motion and neck supple  Right lower leg: No edema  Left lower leg: No edema  Comments: No tremor of the outstretched hands  No spinous process tenderness  No CVA tenderness  Lymphadenopathy:      Cervical: No cervical adenopathy  Skin:     General: Skin is warm and dry  Findings: No rash  Neurological:      Mental Status: She is alert and oriented to person, place, and time  Deep Tendon Reflexes: Reflexes are normal and symmetric  Comments: Deep tendon reflexes normal          The history was obtained from the review of the chart and from the patient      Lab Results:          Lab Results   Component Value Date    CREATININE 0 76 05/13/2021    CREATININE 0 66 12/05/2019    CREATININE 0 79 11/08/2018    BUN 10 05/13/2021    K 3 9 05/13/2021     (H) 05/13/2021    CO2 21 05/13/2021       Lab Results   Component Value Date    HDL 50 05/13/2021    TRIG 101 05/13/2021    CHOLHDL 3 0 11/08/2018       Lab Results   Component Value Date    ALT 12 05/13/2021    AST 17 05/13/2021       Lab Results   Component Value Date    TSH 5 800 (H) 05/13/2021    FREET4 1 38 08/06/2020       Future Appointments   Date Time Provider [facility]   8/16/2021  2:00 PM Angela Garber MD [facility]

## 2021-08-13 NOTE — PATIENT INSTRUCTIONS
Let's get blood work done now  Call within 1 week for results if we do not call you first   Continue the same levothyroxine 75 mcg daily for now  Make sure to follow up with PCP for depression treatment     Follow up to be determined

## 2021-08-16 ENCOUNTER — OFFICE VISIT (OUTPATIENT)
Dept: FAMILY MEDICINE CLINIC | Facility: CLINIC | Age: 61
End: 2021-08-16
Payer: COMMERCIAL

## 2021-08-16 VITALS
SYSTOLIC BLOOD PRESSURE: 108 MMHG | DIASTOLIC BLOOD PRESSURE: 70 MMHG | HEART RATE: 98 BPM | WEIGHT: 176 LBS | TEMPERATURE: 97.7 F | BODY MASS INDEX: 32.39 KG/M2 | HEIGHT: 62 IN | OXYGEN SATURATION: 97 %

## 2021-08-16 DIAGNOSIS — F43.21 SITUATIONAL DEPRESSION: Primary | ICD-10-CM

## 2021-08-16 DIAGNOSIS — E03.9 ACQUIRED HYPOTHYROIDISM: ICD-10-CM

## 2021-08-16 PROBLEM — Z00.00 WELL ADULT EXAM: Status: RESOLVED | Noted: 2021-05-21 | Resolved: 2021-08-16

## 2021-08-16 PROBLEM — Z01.419 ENCOUNTER FOR ANNUAL ROUTINE GYNECOLOGICAL EXAMINATION: Status: RESOLVED | Noted: 2021-06-27 | Resolved: 2021-08-16

## 2021-08-16 PROBLEM — Z12.31 ENCOUNTER FOR SCREENING MAMMOGRAM FOR MALIGNANT NEOPLASM OF BREAST: Status: RESOLVED | Noted: 2021-05-21 | Resolved: 2021-08-16

## 2021-08-16 PROCEDURE — 1036F TOBACCO NON-USER: CPT | Performed by: FAMILY MEDICINE

## 2021-08-16 PROCEDURE — 99214 OFFICE O/P EST MOD 30 MIN: CPT | Performed by: FAMILY MEDICINE

## 2021-08-16 PROCEDURE — 3008F BODY MASS INDEX DOCD: CPT | Performed by: FAMILY MEDICINE

## 2021-08-16 NOTE — ASSESSMENT & PLAN NOTE
At this point the patient does not want to start medication I would agree with this  I think this is situational and she may just need some support getting through it  We discussed different options and we are going to have her get set up to see Arabella Felton here in the office for therapy  If at any point she decide she wants something to help her sleep at night or if she in Arabella Felton decide an antidepressant would be a good option we can always discuss this further  More than half of this 25 minute visit spent counseling and coordinating care

## 2021-08-16 NOTE — ASSESSMENT & PLAN NOTE
Still with an elevated TSH on last check, she is going for labs soon, she is following with endocrinology

## 2021-08-16 NOTE — PROGRESS NOTES
Subjective:   Chief Complaint   Patient presents with    Follow-up     Pt saw Dr Mary Jo Schuster on Friday and she suggested that she follow-up with her PCP  Patient ID: Faith Cheek is a 63 y o  female  The pt is here today because she is suffering with depression  She was fired from her job - she went part time because of her severe fatigue related to her hypothyroidism    This is a new diagnosis, she has been trying to get it under control, she is seeing Dr Mary Jo Schuster and they have been changing medicine doses  It is still not under control  He wanted her to go full time - she could not - so she was fired  It was very abrupt  She was told the day after this conversation it was her last day  She has been feeling very worthless     She saw Dr Mary Jo Schuster yesterday who has labs ordered for her   She advised that she come here because she was concerned about the patient's depression      The following portions of the patient's history were reviewed and updated as appropriate: allergies, current medications, past family history, past medical history, past social history, past surgical history and problem list     Review of Systems          Objective:  Vitals:    08/16/21 1354   BP: 108/70   BP Location: Right arm   Patient Position: Sitting   Cuff Size: Standard   Pulse: 98   Temp: 97 7 °F (36 5 °C)   SpO2: 97%   Weight: 79 8 kg (176 lb)   Height: 5' 2" (1 575 m)      Physical Exam  Constitutional:       General: She is not in acute distress  Appearance: Normal appearance  She is not ill-appearing  Skin:     General: Skin is warm and dry  Findings: No erythema or rash  Neurological:      General: No focal deficit present  Mental Status: She is alert and oriented to person, place, and time  Cranial Nerves: No cranial nerve deficit  Gait: Gait normal    Psychiatric:         Mood and Affect: Mood normal          Behavior: Behavior normal          Thought Content:  Thought content normal          Judgment: Judgment normal            Assessment/Plan:    Situational depression   At this point the patient does not want to start medication I would agree with this  I think this is situational and she may just need some support getting through it  We discussed different options and we are going to have her get set up to see Chencho Escobar here in the office for therapy  If at any point she decide she wants something to help her sleep at night or if she in Chencho Escobar decide an antidepressant would be a good option we can always discuss this further  More than half of this 25 minute visit spent counseling and coordinating care  Acquired hypothyroidism    Still with an elevated TSH on last check, she is going for labs soon, she is following with endocrinology         Diagnoses and all orders for this visit:    Situational depression    Acquired hypothyroidism

## 2021-08-23 ENCOUNTER — SOCIAL WORK (OUTPATIENT)
Dept: BEHAVIORAL/MENTAL HEALTH CLINIC | Facility: CLINIC | Age: 61
End: 2021-08-23
Payer: COMMERCIAL

## 2021-08-23 DIAGNOSIS — F43.21 SITUATIONAL DEPRESSION: Primary | ICD-10-CM

## 2021-08-23 PROCEDURE — 90834 PSYTX W PT 45 MINUTES: CPT | Performed by: SOCIAL WORKER
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
## 2021-08-24 LAB
T3FREE SERPL-MCNC: 3.1 PG/ML (ref 2–4.4)
T4 FREE SERPL-MCNC: 1.81 NG/DL (ref 0.82–1.77)
THYROGLOB AB SERPL-ACNC: 9.2 IU/ML (ref 0–0.9)
THYROPEROXIDASE AB SERPL-ACNC: 152 IU/ML (ref 0–34)
TSH SERPL DL<=0.005 MIU/L-ACNC: 1.38 UIU/ML (ref 0.45–4.5)

## 2021-08-25 ENCOUNTER — TELEPHONE (OUTPATIENT)
Dept: ENDOCRINOLOGY | Facility: HOSPITAL | Age: 61
End: 2021-08-25

## 2021-08-25 DIAGNOSIS — E03.9 ACQUIRED HYPOTHYROIDISM: Primary | ICD-10-CM

## 2021-08-25 NOTE — TELEPHONE ENCOUNTER
Call patient  Her thyroid blood work is now normal  Her elevated free T4 is not important and can be falsely elevated if she took her thyroid medicine earlier the day the blood work was done when [facility] is normal which is the more important number  She does have thyroid antibodies so hashimoto's thyroid disease is the underlying cause of her hypothyroidism  Continue the same levothyroxine 75 mcg daily for now  Follow up in 3 months with TSH, free T3, free T4

## 2021-09-08 ENCOUNTER — SOCIAL WORK (OUTPATIENT)
Dept: BEHAVIORAL/MENTAL HEALTH CLINIC | Facility: CLINIC | Age: 61
End: 2021-09-08
Payer: COMMERCIAL

## 2021-09-08 DIAGNOSIS — F43.21 SITUATIONAL DEPRESSION: Primary | ICD-10-CM

## 2021-09-08 PROCEDURE — 90834 PSYTX W PT 45 MINUTES: CPT | Performed by: SOCIAL WORKER

## 2021-09-08 NOTE — PSYCH
Assessment/Plan:      Diagnoses and all orders for this visit:    Situational depression          Subjective:     Patient ID: Rose Adam is a 63 y o  female presenting for follow up    HPI    Review of Systems   Psychiatric/Behavioral: Positive for dysphoric mood  Negative for suicidal ideas  The patient is nervous/anxious  Objective:     Physical Exam  HENT:      Right Ear: External ear normal    Psychiatric:         Attention and Perception: Attention and perception normal          Mood and Affect: Mood is anxious  Affect is flat  Speech: Speech normal          Behavior: Behavior is withdrawn  Behavior is cooperative  Thought Content: Thought content normal          Cognition and Memory: Cognition and memory normal          Judgment: Judgment normal            (D) Patient used session to process ongoing experience of anxiety, resentment from previous employer and low self esteem and worth  Patient reports that since last session 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, and had told her new bosses that she "is broken and in pieces" from her previous job, but will do as much, and the best, she can  Patient adds she has known her new bosses for several years, and had worked with them previously, and her new bosses were very understanding of her being "broken" and told her to take her time and ease into the job, according to how ever much she feels she can do  Patient stated this has been a significant help to her, and she feels much more confident and supported in this job  Patient reports she is still very worried about her thyroid numbers being a little higher, and does not understand why the doctor is not concerned  Patient states she called the doctors office about it, and was able to only speak to a nurse, not the doctor  And she was told her numbers are a bit higher, but not concerning, and she can see the doctor in 3 months   Patient also reports she is worried about her 's stress level, as he over does it at work, and has family issues he has dealt with for the past several years  Patient added she is looking forward to, but also nervous about an upcoming camping vacation she and her  are taking  She is happy because they need to get away, and the campground they picked has no wifi  However, she is nervous because 2 years ago, on a camping trip, her dog bit a toddler  The police were called and they were asked to leave the campground  Patient states they are taking their dogs with them this trip and hope to have distance from other campers, for themselves and for the dogs  Patient explored these at length  Patient elaborated on stressors and feelings of  mistrust in self and others, worry, anxiety, becoming easily irritated in relation to such  Patient reports she started recently accepting what is, and what is out of her control, which has been helpful  This writer provided supportive counseling and a listening, validating ear  Patient and this writer processed feelings related to worry, and worry about others and how others act or decisions they make that patient does not understand or that patient feels is disrespectful or not right in her eyes  Supportive questioning used to explore cognitive restructuring and to differentiate between perceptions of what she can control and of what is right  Supportive questioning also used to explore where patient may engage in all or nothing thinking, jumping to conclusions and blaming self or others incorrectly  (A) Patient denies experiencing thoughts or attempts of self-harm, SI, or HI  Patient presented as forward thinking during session, and discussed upcoming plans  Patient presented with good eye contact  Patient's mood presented as anxious and affect congruent  Patient was mildly withdrawn, but easily engaged, shared openly, was responsive to treatment and receptive to treatment suggestions   Pt expressed commitment to treatment  (P) Validated and normalized patients emotional responses and reviewed coping and self care  Specifically ways patient can engage in radical acceptance, challenging and reframing cognitive distortions and identifying healthy boundaries to the stressors discussed  This writer provided psychoeducation, and discussed and reviewed various forms of coping skills, grounding techniques and distress tolerance skills to identify the distortion, examine the evidence, experiment and test the validity of negative thoughts, scale and evaluate things in relation, to self manage symptoms more effectively  Discussed the importance of setting limits and boundaries and prioritizing mental health needs  Patient plans to work on focusing on what they have control over, verses what they dont have control over and questioning evidence for cognitive distortions  Patient plans to actively work on being present in the moment  Patient plans to continue to work on asserting themselves and advocating for themselves, and utilizing effective forms of communication, by expressing their feelings  Patient plans to implement limits and boundaries  Patient plans to continue to work on prioritizing their needs, and taking time for themselves  Patient plans to work on increasing effective forms of communication to strengthen interpersonal relationships  Patient plans to observe, monitor, and track, symptoms, cycles, and stressors to further explore how triggers impact overall symptom presentation

## 2021-09-30 ENCOUNTER — CLINICAL SUPPORT (OUTPATIENT)
Dept: FAMILY MEDICINE CLINIC | Facility: CLINIC | Age: 61
End: 2021-09-30
Payer: COMMERCIAL

## 2021-09-30 ENCOUNTER — SOCIAL WORK (OUTPATIENT)
Dept: BEHAVIORAL/MENTAL HEALTH CLINIC | Facility: CLINIC | Age: 61
End: 2021-09-30
Payer: COMMERCIAL

## 2021-09-30 DIAGNOSIS — Z23 NEED FOR INFLUENZA VACCINATION: Primary | ICD-10-CM

## 2021-09-30 DIAGNOSIS — F43.21 SITUATIONAL DEPRESSION: Primary | ICD-10-CM

## 2021-09-30 PROCEDURE — 90834 PSYTX W PT 45 MINUTES: CPT | Performed by: SOCIAL WORKER

## 2021-09-30 PROCEDURE — 90682 RIV4 VACC RECOMBINANT DNA IM: CPT

## 2021-09-30 PROCEDURE — 90471 IMMUNIZATION ADMIN: CPT

## 2021-09-30 NOTE — PSYCH
Assessment/Plan:      Diagnoses and all orders for this visit:    Situational depression          Subjective:     Patient ID: Dianna Schultz is a 63 y o  female presenting for follow up    HPI    Review of Systems   Psychiatric/Behavioral: Positive for dysphoric mood  Negative for sleep disturbance and suicidal ideas  Pt continues with some depression, but improvement noted this visit, with brighter affect, along with pt's self report of improvement  Pt reports improved sleep as well  Objective:     Physical Exam  Psychiatric:         Attention and Perception: Attention and perception normal          Mood and Affect: Mood is depressed  Speech: Speech normal          Behavior: Behavior normal          Thought Content: Thought content normal          Cognition and Memory: Cognition and memory normal          Judgment: Judgment normal            (D) Patient used visit to process ongoing experience of depression related to being let go/given the option to leave a previous job  Patient reports that since last visit her new job is going well, and pt now sees the events leading up to this new job, as well as the new job itself, as a blessing in disguise  Patient spent time discussing and processing feelings of disappointment, sadness, feeling overwhelmed and not valued as a person or an employee from past job  Pt feels she was very loyal, a hard and honest worker and gave that job "her all" for 16 yrs  Pt states she could not believe how poorly she was treated after all those years, and continues to feel her leaving was not her fault  Pt states her boss became overly selfish and demanding, and refused to let her cut her hours due to her health problems  Pt feels disrespected and that her years of service, hard work, dedication and herself in general were no longer valued  Pt states she feels very valued and respected at her new job, and feels it is the biggest blessing in her life right now   Pt reports improvement in her mood and sleep, as a result  Pt is focused on her health, and her follow up with her specialist in 1 month  Pt is hopeful her reduced stress levels will have a positive effect on her physical health  Patient reports she and her  recently took a much needed vacation and went camping, and have scheduled another camping trip for the end of the month, which has been helpful  This writer provided supportive counseling and a listening, validating ear  Patient and this writer processed feelings of resentment, feeling under valued and of finding kumar and purpose in her new job  Grief work used to process feelings of loosing previous job, and supportive questioning used for cognitive restructuring and reframing the experience to one of gaining wisdowm and insight into self and pt's needs, healthy boundaries and what self care looks like for her, along with when to put those things in place before she feels overwhelmed and deeply depressed  Pt and this writer reviewed progress and goals, with pt stating she is feeling much better, more calm and at peace and more motivated with her new job  (A) Patient denies experiencing thoughts or attempts of self-harm, SI, or HI  Patient presented as forward thinking during session, and discussed upcoming plans  Patient is well groomed, and presented with good eye contact  Patient's mood presented as depressed, yet improvement noted with brighter affect  Patient was easily engaged, shared openly, was responsive to treatment and receptive to treatment suggestions  (P) Validated and normalized patients emotional responses and reviewed coping and self care  Specifically ways patient can engage in radical acceptance, reframing cognitive distortions and creating healthy boundaries to the stressors discussed   This writer provided psychoeducation, and discussed and reviewed various forms of coping skills and grounding techniques to self manage symptoms more effectively  Discussed the importance of setting limits and boundaries and prioritizing mental health needs  Patient agreeable to practice, and plans to work on, continuing engaging in self care and focusing on the kumar in her life in the coming weeks to manage symptoms of depression and improve emotional and cognitive regulation  Pt plans to continue to explore unmet needs, and ask for what she needs  Patient plans to continue to work on asserting themselves and advocating for themselves, and utilizing effective forms of communication, by expressing their feelings  Patient plans to implement limits and boundaries  Patient plans to continue to work on prioritizing their needs and taking time for themselves  Patient plans to follow up with this therapist on a monthly basis to check in on mental health and receive support in a solution-based care therapeutic setting        45 mins spent directly with patient, 3pm-3:45pm

## 2021-11-15 LAB
T3FREE SERPL-MCNC: 3.1 PG/ML (ref 2.3–4.2)
T4 FREE SERPL-MCNC: 1.4 NG/DL (ref 0.8–1.8)
TSH SERPL-ACNC: 1.55 MIU/L (ref 0.4–4.5)

## 2021-11-27 ENCOUNTER — IMMUNIZATIONS (OUTPATIENT)
Dept: FAMILY MEDICINE CLINIC | Facility: HOSPITAL | Age: 61
End: 2021-11-27

## 2021-11-27 DIAGNOSIS — Z23 ENCOUNTER FOR IMMUNIZATION: Primary | ICD-10-CM

## 2021-11-27 PROCEDURE — 91300 COVID-19 PFIZER VACC 0.3 ML: CPT

## 2021-11-27 PROCEDURE — 0001A COVID-19 PFIZER VACC 0.3 ML: CPT

## 2021-11-29 ENCOUNTER — OFFICE VISIT (OUTPATIENT)
Dept: ENDOCRINOLOGY | Facility: HOSPITAL | Age: 61
End: 2021-11-29
Payer: COMMERCIAL

## 2021-11-29 VITALS
DIASTOLIC BLOOD PRESSURE: 78 MMHG | WEIGHT: 173.6 LBS | HEART RATE: 94 BPM | BODY MASS INDEX: 31.94 KG/M2 | SYSTOLIC BLOOD PRESSURE: 120 MMHG | HEIGHT: 62 IN

## 2021-11-29 DIAGNOSIS — E06.3 HYPOTHYROIDISM DUE TO HASHIMOTO'S THYROIDITIS: Primary | ICD-10-CM

## 2021-11-29 DIAGNOSIS — E03.8 HYPOTHYROIDISM DUE TO HASHIMOTO'S THYROIDITIS: Primary | ICD-10-CM

## 2021-11-29 PROCEDURE — 99213 OFFICE O/P EST LOW 20 MIN: CPT | Performed by: INTERNAL MEDICINE

## 2022-05-25 LAB
T3FREE SERPL-MCNC: 3.4 PG/ML (ref 2.3–4.2)
T4 FREE SERPL-MCNC: 1.6 NG/DL (ref 0.8–1.8)
TSH SERPL-ACNC: 1.44 MIU/L (ref 0.4–4.5)

## 2022-06-01 ENCOUNTER — OFFICE VISIT (OUTPATIENT)
Dept: ENDOCRINOLOGY | Facility: HOSPITAL | Age: 62
End: 2022-06-01
Payer: COMMERCIAL

## 2022-06-01 VITALS
SYSTOLIC BLOOD PRESSURE: 120 MMHG | WEIGHT: 178 LBS | DIASTOLIC BLOOD PRESSURE: 80 MMHG | HEIGHT: 62 IN | BODY MASS INDEX: 32.76 KG/M2 | HEART RATE: 86 BPM

## 2022-06-01 DIAGNOSIS — E06.3 HYPOTHYROIDISM DUE TO HASHIMOTO'S THYROIDITIS: Primary | ICD-10-CM

## 2022-06-01 DIAGNOSIS — E03.8 HYPOTHYROIDISM DUE TO HASHIMOTO'S THYROIDITIS: Primary | ICD-10-CM

## 2022-06-01 DIAGNOSIS — E03.9 ACQUIRED HYPOTHYROIDISM: ICD-10-CM

## 2022-06-01 PROCEDURE — 1036F TOBACCO NON-USER: CPT | Performed by: INTERNAL MEDICINE

## 2022-06-01 PROCEDURE — 3008F BODY MASS INDEX DOCD: CPT | Performed by: INTERNAL MEDICINE

## 2022-06-01 PROCEDURE — 99213 OFFICE O/P EST LOW 20 MIN: CPT | Performed by: INTERNAL MEDICINE

## 2022-06-01 RX ORDER — LEVOTHYROXINE SODIUM 0.07 MG/1
75 TABLET ORAL
Qty: 90 TABLET | Refills: 3 | Status: SHIPPED | OUTPATIENT
Start: 2022-06-01

## 2022-06-01 NOTE — PROGRESS NOTES
6/2/2022    Assessment/Plan      Diagnoses and all orders for this visit:    Hypothyroidism due to Hashimoto's thyroiditis  -     TSH, 3rd generation Lab Collect; Future  -     T4, free Lab Collect; Future  -     T3, free; Future  -     TSH, 3rd generation Lab Collect  -     T4, free Lab Collect  -     T3, free    Acquired hypothyroidism  -     levothyroxine 75 mcg tablet; Take 1 tablet (75 mcg total) by mouth daily in the early morning        Assessment/Plan:  Hypothyroidism due to Hashimoto's thyroiditis  The most recent thyroid blood work is normal  She will continue the same Synthroid brand 75 mcg daily  She could slightly increase the dose slightly or add T3 and decrease the T4 slightly if this is done but we will wait on this for now  She will call if she decides to do this  I have asked her to follow up in 1 year with preceding TSH, Free T3,  and Free T4       CC:  Hypothyroid follow-up    History of Present Illness     HPI: Serena Osorio is a 63y o  year old female with history of hypothyroidism due to Hashimoto's thyroiditis for follow-up visit  She was diagnosed with hypothyroidism in 2018 when she complained of severe fatigue, depression, and weight gain  Recently, thyroid function tests do show hypothyroidism in the setting depression symptoms with difficulty with others  This improved when she quit her job  She is currently on levothyroxine 75 mcg daily  Weight is 5 lb more than nov 2021  She continue to struggle with weight loss despite exercising  She has fatigue that comes and goes  She has occasional constipation  She can get very cold for unclear reasons  She is having some brain fog  She denies insomnia, anxiety or depression, tremors, or palpittions  She denies diarrhea or heat intolerance  She denies dry skin, brittle nails, or hair loss  She denies diplopia  Review of Systems   Constitutional: Positive for fatigue and unexpected weight change          Continuing to struggle with weight loss even with watching diet, Is walking 5 days a week  Weight 5 lb more than November 2021  Fatigue comes and goes at times  It is not daily, occurs at least once or twice a week, 3 times at the most in a week  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  HENT: Negative for trouble swallowing  Eyes: Negative for visual disturbance  No diplopia  Respiratory: Negative for chest tightness and shortness of breath  Cardiovascular: Negative for chest pain and palpitations  Gastrointestinal: Positive for constipation  Negative for abdominal pain, diarrhea and nausea  Some occasional indigestion  Has occasional constipation  Endocrine: Positive for cold intolerance  Negative for heat intolerance  Can get very cold for no reason, it can occur suddenly  Skin: Negative for rash  No dry skin  No brittle nails  No hair loss  Neurological: Negative for dizziness, tremors, light-headedness and headaches  Psychiatric/Behavioral: Positive for decreased concentration  Negative for dysphoric mood and sleep disturbance  The patient is not nervous/anxious  Having still some brain fogginess          Historical Information   Past Medical History:   Diagnosis Date    Acquired hypothyroidism 11/9/2018    Depression      Past Surgical History:   Procedure Laterality Date    BREAST BIOPSY Right 07/29/2014    US core bx    TONSILLECTOMY      childhood     Social History   Social History     Substance and Sexual Activity   Alcohol Use Yes    Comment: occaional glass of wine     Social History     Substance and Sexual Activity   Drug Use Never     Social History     Tobacco Use   Smoking Status Never Smoker   Smokeless Tobacco Never Used     Family History:   Family History   Problem Relation Age of Onset    Asthma Mother     Heart disease Mother         scarlet fever induced valvular disease with valve replacement    Leukemia Mother     Valvular heart disease Mother    Martha Qureshi Diabetes Father         Mellitus    Pancreatic cancer Father 80    No Known Problems Son     No Known Problems Son     No Known Problems Paternal Aunt     No Known Problems Paternal Aunt        Meds/Allergies   Current Outpatient Medications   Medication Sig Dispense Refill    levothyroxine 75 mcg tablet Take 1 tablet (75 mcg total) by mouth daily in the early morning 90 tablet 3     No current facility-administered medications for this visit  No Known Allergies    Objective   Vitals: Blood pressure 120/80, pulse 86, height 5' 2" (1 575 m), weight 80 7 kg (178 lb)  Invasive Devices  Report    None                 Physical Exam  Vitals reviewed  Constitutional:       Appearance: Normal appearance  She is well-developed  HENT:      Head: Normocephalic and atraumatic  Eyes:      Extraocular Movements: Extraocular movements intact  Conjunctiva/sclera: Conjunctivae normal       Comments: No lid lag  Stare, proptosis, or periorbital edema  Neck:      Thyroid: No thyromegaly  Vascular: No carotid bruit  Comments: Thyroid is normal in size  She has no palpable thyroid nodules  Cardiovascular:      Rate and Rhythm: Normal rate and regular rhythm  Heart sounds: Normal heart sounds  No murmur heard  Pulmonary:      Effort: Pulmonary effort is normal       Breath sounds: Normal breath sounds  No wheezing  Abdominal:      Palpations: Abdomen is soft  Musculoskeletal:         General: No deformity  Normal range of motion  Cervical back: Normal range of motion and neck supple  Right lower leg: No edema  Left lower leg: No edema  Comments: No tremor of the outstretched hands  Lymphadenopathy:      Cervical: No cervical adenopathy  Skin:     General: Skin is warm and dry  Findings: No rash  Neurological:      Mental Status: She is alert and oriented to person, place, and time  Deep Tendon Reflexes: Reflexes are normal and symmetric        Comments: Patellar deep tendon reflexes are normal           The history was obtained from the review of the chart and from the patient  Lab Results:          Lab Results   Component Value Date    CREATININE 0 76 05/13/2021    CREATININE 0 66 12/05/2019    CREATININE 0 79 11/08/2018    BUN 10 05/13/2021    K 3 9 05/13/2021     (H) 05/13/2021    CO2 21 05/13/2021       Lab Results   Component Value Date    HDL 50 05/13/2021    TRIG 101 05/13/2021    CHOLHDL 3 0 11/08/2018       Lab Results   Component Value Date    ALT 12 05/13/2021    AST 17 05/13/2021       Lab Results   Component Value Date    TSH 1 44 05/25/2022    FREET4 1 6 05/25/2022     Free T3 is 3 4        Future Appointments   Date Time Provider Maikol Maloney   6/1/2023  1:00 PM Brandon Bustamante MD [facility]

## 2022-06-01 NOTE — PATIENT INSTRUCTIONS
The thyroid blood work is normal     Continue the same synthroid 75 mcg daily(you are getting [facility] even though it is reported as generic)  We do have the option of slightly increasing the dose or adding T3 but doing this may cause an overactive thyroid  Call if you decide you would like to do this  Follow up in 1 year with blood work

## 2022-09-28 ENCOUNTER — CLINICAL SUPPORT (OUTPATIENT)
Dept: FAMILY MEDICINE CLINIC | Facility: CLINIC | Age: 62
End: 2022-09-28
Payer: COMMERCIAL

## 2022-09-28 DIAGNOSIS — Z23 ENCOUNTER FOR IMMUNIZATION: Primary | ICD-10-CM

## 2022-09-28 PROCEDURE — 90471 IMMUNIZATION ADMIN: CPT

## 2022-09-28 PROCEDURE — 90682 RIV4 VACC RECOMBINANT DNA IM: CPT

## 2023-05-25 LAB
T3FREE SERPL-MCNC: 3.3 PG/ML (ref 2.3–4.2)
T4 FREE SERPL-MCNC: 1.4 NG/DL (ref 0.8–1.8)
TSH SERPL-ACNC: 1.38 MIU/L (ref 0.4–4.5)

## 2023-06-01 ENCOUNTER — OFFICE VISIT (OUTPATIENT)
Dept: ENDOCRINOLOGY | Facility: HOSPITAL | Age: 63
End: 2023-06-01

## 2023-06-01 VITALS
BODY MASS INDEX: 33.86 KG/M2 | DIASTOLIC BLOOD PRESSURE: 80 MMHG | SYSTOLIC BLOOD PRESSURE: 122 MMHG | WEIGHT: 184 LBS | HEART RATE: 84 BPM | HEIGHT: 62 IN

## 2023-06-01 DIAGNOSIS — E06.3 HYPOTHYROIDISM DUE TO HASHIMOTO'S THYROIDITIS: Primary | ICD-10-CM

## 2023-06-01 DIAGNOSIS — E03.9 ACQUIRED HYPOTHYROIDISM: ICD-10-CM

## 2023-06-01 DIAGNOSIS — E03.8 HYPOTHYROIDISM DUE TO HASHIMOTO'S THYROIDITIS: Primary | ICD-10-CM

## 2023-06-01 RX ORDER — LEVOTHYROXINE SODIUM 0.07 MG/1
75 TABLET ORAL
Qty: 90 TABLET | Refills: 3 | Status: SHIPPED | OUTPATIENT
Start: 2023-06-01

## 2023-06-01 NOTE — PROGRESS NOTES
6/1/2023    Assessment/Plan      Diagnoses and all orders for this visit:    Hypothyroidism due to Hashimoto's thyroiditis  -     TSH, 3rd generation Lab Collect; Future  -     T4, free Lab Collect; Future  -     TSH, 3rd generation Lab Collect  -     T4, free Lab Collect    Acquired hypothyroidism  -     levothyroxine 75 mcg tablet; Take 1 tablet (75 mcg total) by mouth daily in the early morning        Assessment/Plan:  Hypothyroidism due to Hashimoto's thyroiditis  Most recent thyroid function studies are normal   She is biochemically and clinically euthyroid  She will continue the same levothyroxine 75 mcg daily  I have asked her to follow-up in 1 year with preceding TSH and free T4       CC: Hypothyroid follow-up    History of Present Illness     HPI: Sondra Christopher is a 66y o  year old female with history of hypothyroidism due to Hashimoto's thyroiditis for follow-up visit  She was diagnosed with hypothyroidism in 2018 when she complained of severe fatigue, depression, and weight gain  Recently, thyroid function tests do show hypothyroidism in the setting depression symptoms with difficulty with others  This improved when she quit her job        She is currently on levothyroxine 75 mcg daily  She can be cold after eating  She has some constipation  She has some fatigue at times  She has some dry skin of the hands but no brittle nails or hair loss  She denies heat intolerance, weight changes, palpitation, tremors, diarrhea, anxiety or depression, or insomnia  She denies diplopia  She denies compressive thyroid symptoms or difficulties with swallowing  Review of Systems   Constitutional: Negative for fatigue and unexpected weight change  Fatigue some days  HENT: Negative for trouble swallowing  Eyes: Negative for visual disturbance  No diplopia  Respiratory: Negative for chest tightness and shortness of breath      Cardiovascular: Negative for chest pain and palpitations  Gastrointestinal: Positive for constipation  Negative for abdominal pain, diarrhea and nausea  Some constipation  Endocrine: Positive for cold intolerance  Negative for heat intolerance  Can be cold after eating  Skin: Negative for rash  Some dry skin of the hands  No brittle nails  No hair loss  Neurological: Negative for dizziness, tremors, light-headedness and headaches  Psychiatric/Behavioral: Negative for dysphoric mood and sleep disturbance  The patient is not nervous/anxious  Her new boss went thyroid some issues in the thyroid  Thyroid removed and was cancer  Historical Information   Past Medical History:   Diagnosis Date   • Acquired hypothyroidism 11/9/2018   • Depression      Past Surgical History:   Procedure Laterality Date   • BREAST BIOPSY Right 07/29/2014    US core bx   • TONSILLECTOMY      childhood     Social History   Social History     Substance and Sexual Activity   Alcohol Use Yes    Comment: occaional glass of wine     Social History     Substance and Sexual Activity   Drug Use Never     Social History     Tobacco Use   Smoking Status Never   Smokeless Tobacco Never     Family History:   Family History   Problem Relation Age of Onset   • Asthma Mother    • Heart disease Mother         scarlet fever induced valvular disease with valve replacement   • Leukemia Mother    • Valvular heart disease Mother    • Diabetes Father         Mellitus   • Pancreatic cancer Father 80   • No Known Problems Son    • No Known Problems Son    • No Known Problems Paternal Aunt    • No Known Problems Paternal Aunt        Meds/Allergies   Current Outpatient Medications   Medication Sig Dispense Refill   • levothyroxine 75 mcg tablet Take 1 tablet (75 mcg total) by mouth daily in the early morning 90 tablet 3     No current facility-administered medications for this visit       No Known Allergies    Objective   Vitals: Blood pressure 122/80, pulse 84, "height 5' 2\" (1 575 m), weight 83 5 kg (184 lb)  Invasive Devices     None                 Physical Exam  Vitals reviewed  Constitutional:       Appearance: Normal appearance  She is well-developed  She is obese  HENT:      Head: Normocephalic and atraumatic  Eyes:      Extraocular Movements: Extraocular movements intact  Conjunctiva/sclera: Conjunctivae normal       Comments: No lid lag, stare, proptosis, or periorbital edema  Neck:      Thyroid: No thyromegaly  Vascular: No carotid bruit  Comments: Thyroid normal in size  No palpable thyroid nodules  Cardiovascular:      Rate and Rhythm: Normal rate and regular rhythm  Heart sounds: Normal heart sounds  No murmur heard  Pulmonary:      Effort: Pulmonary effort is normal       Breath sounds: Normal breath sounds  No wheezing  Abdominal:      Palpations: Abdomen is soft  Musculoskeletal:         General: No deformity  Normal range of motion  Cervical back: Normal range of motion and neck supple  Right lower leg: No edema  Left lower leg: No edema  Comments: No tremor of the outstretched hands  Lymphadenopathy:      Cervical: No cervical adenopathy  Skin:     General: Skin is warm and dry  Findings: No rash  Neurological:      Mental Status: She is alert and oriented to person, place, and time  Deep Tendon Reflexes: Reflexes are normal and symmetric  Comments: Patellar deep tendon reflexes normal          The history was obtained from the review of the chart and from the patient      Lab Results:          Lab Results   Component Value Date    BUN 10 05/13/2021     (H) 05/13/2021    CO2 21 05/13/2021    CREATININE 0 76 05/13/2021    CREATININE 0 66 12/05/2019    CREATININE 0 79 11/08/2018    K 3 9 05/13/2021       Lab Results   Component Value Date    CHOLHDL 3 0 11/08/2018    HDL 50 05/13/2021    TRIG 101 05/13/2021       Lab Results   Component Value Date    ALT 12 05/13/2021    AST 17 " 05/13/2021       Lab Results   Component Value Date    FREET4 1 4 05/25/2023    TSH 1 38 05/25/2023             Future Appointments   Date Time Provider Maikol Maloney   5/30/2024  1:00 PM Billy Vizcarra MD [facility]

## 2023-10-10 ENCOUNTER — OFFICE VISIT (OUTPATIENT)
Dept: FAMILY MEDICINE CLINIC | Facility: CLINIC | Age: 63
End: 2023-10-10
Payer: COMMERCIAL

## 2023-10-10 VITALS
WEIGHT: 181.4 LBS | OXYGEN SATURATION: 97 % | SYSTOLIC BLOOD PRESSURE: 124 MMHG | HEART RATE: 93 BPM | DIASTOLIC BLOOD PRESSURE: 80 MMHG | BODY MASS INDEX: 33.38 KG/M2 | HEIGHT: 62 IN | TEMPERATURE: 97.6 F

## 2023-10-10 DIAGNOSIS — Z00.00 WELL ADULT EXAM: Primary | ICD-10-CM

## 2023-10-10 DIAGNOSIS — Z13.29 SCREENING FOR ENDOCRINE DISORDER: ICD-10-CM

## 2023-10-10 DIAGNOSIS — E06.3 HYPOTHYROIDISM DUE TO HASHIMOTO'S THYROIDITIS: ICD-10-CM

## 2023-10-10 DIAGNOSIS — E03.8 HYPOTHYROIDISM DUE TO HASHIMOTO'S THYROIDITIS: ICD-10-CM

## 2023-10-10 DIAGNOSIS — Z12.31 ENCOUNTER FOR SCREENING MAMMOGRAM FOR BREAST CANCER: ICD-10-CM

## 2023-10-10 DIAGNOSIS — Z23 ENCOUNTER FOR IMMUNIZATION: ICD-10-CM

## 2023-10-10 DIAGNOSIS — Z13.220 SCREENING CHOLESTEROL LEVEL: ICD-10-CM

## 2023-10-10 DIAGNOSIS — Z12.11 ENCOUNTER FOR SCREENING COLONOSCOPY: ICD-10-CM

## 2023-10-10 DIAGNOSIS — Z13.0 SCREENING, ANEMIA, DEFICIENCY, IRON: ICD-10-CM

## 2023-10-10 PROBLEM — F51.5 NIGHTMARES: Status: RESOLVED | Noted: 2019-10-22 | Resolved: 2023-10-10

## 2023-10-10 PROBLEM — F43.21 SITUATIONAL DEPRESSION: Status: RESOLVED | Noted: 2021-08-16 | Resolved: 2023-10-10

## 2023-10-10 PROCEDURE — 90686 IIV4 VACC NO PRSV 0.5 ML IM: CPT

## 2023-10-10 PROCEDURE — 90471 IMMUNIZATION ADMIN: CPT

## 2023-10-10 PROCEDURE — 99396 PREV VISIT EST AGE 40-64: CPT | Performed by: FAMILY MEDICINE

## 2023-10-10 PROCEDURE — 3725F SCREEN DEPRESSION PERFORMED: CPT | Performed by: FAMILY MEDICINE

## 2023-10-10 NOTE — PATIENT INSTRUCTIONS
Fasting blood work - [facility]   Pili will come in the mail  Schedule mammogram  Influenza immunization today     Wellness Visit for Adults   AMBULATORY CARE:   A wellness visit  is when you see your healthcare provider to get screened for health problems. Your healthcare provider will also give you advice on how to stay healthy. Write down your questions so you remember to ask them. Ask your healthcare provider how often you should have a wellness visit. What happens at a wellness visit:  Your healthcare provider will ask about your health, and your family history of health problems. This includes high blood pressure, heart disease, and cancer. He or she will ask if you have symptoms that concern you, if you smoke, and about your mood. You may also be asked about your intake of medicines, supplements, food, and alcohol. Any of the following may be done: Your weight  will be checked. Your height may also be checked so your body mass index (BMI) can be calculated. Your BMI shows if you are at a healthy weight. Your blood pressure  and heart rate will be checked. Your temperature may also be checked. Blood and urine tests  may be done. Blood tests may be done to check your cholesterol levels. Abnormal cholesterol levels increase your risk for heart disease and stroke. You may also need a blood or urine test to check for diabetes if you are at increased risk. Urine tests may be done to look for signs of an infection or kidney disease. A physical exam  includes checking your heartbeat and lungs with a stethoscope. Your healthcare provider may also check your skin to look for sun damage. Screening tests  may be recommended. A screening test is done to check for diseases that may not cause symptoms. The screening tests you may need depend on your age, gender, family history, and lifestyle habits. For example, colorectal screening may be recommended if you are 48years old or older.     Screening tests you need if you are a woman:   A Pap smear  is used to screen for cervical cancer. Pap smears are usually done every 3 to 5 years depending on your age. You may need them more often if you have had abnormal Pap smear test results in the past. Ask your healthcare provider how often you should have a Pap smear. A mammogram  is an x-ray of your breasts to screen for breast cancer. Experts recommend mammograms every 2 years starting at age 48 years. You may need a mammogram at age 52 years or younger if you have an increased risk for breast cancer. Talk to your healthcare provider about when you should start having mammograms and how often you need them. Vaccines you may need:   Get an influenza vaccine  every year. The influenza vaccine protects you from the flu. Several types of viruses cause the flu. The viruses change over time, so new vaccines are made each year. Get a tetanus-diphtheria (Td) booster vaccine  every 10 years. This vaccine protects you against tetanus and diphtheria. Tetanus is a severe infection that may cause painful muscle spasms and lockjaw. Diphtheria is a severe bacterial infection that causes a thick covering in the back of your mouth and throat. Get a human papillomavirus (HPV) vaccine  if you are female and aged 23 to 32 or male 23 to 24 and never received it. This vaccine protects you from HPV infection. HPV is the most common infection spread by sexual contact. HPV may also cause vaginal, penile, and anal cancers. Get a pneumococcal vaccine  if you are aged 72 years or older. The pneumococcal vaccine is an injection given to protect you from pneumococcal disease. Pneumococcal disease is an infection caused by pneumococcal bacteria. The infection may cause pneumonia, meningitis, or an ear infection. Get a shingles vaccine  if you are 60 or older, even if you have had shingles before. The shingles vaccine is an injection to protect you from the varicella-zoster virus.  This is the same virus that causes chickenpox. Shingles is a painful rash that develops in people who had chickenpox or have been exposed to the virus. How to eat healthy:  My Plate is a model for planning healthy meals. It shows the types and amounts of foods that should go on your plate. Fruits and vegetables make up about half of your plate, and grains and protein make up the other half. A serving of dairy is included on the side of your plate. The amount of calories and serving sizes you need depends on your age, gender, weight, and height. Examples of healthy foods are listed below:  Eat a variety of vegetables  such as dark green, red, and orange vegetables. You can also include canned vegetables low in sodium (salt) and frozen vegetables without added butter or sauces. Eat a variety of fresh fruits , canned fruit in 100% juice, frozen fruit, and dried fruit. Include whole grains. At least half of the grains you eat should be whole grains. Examples include whole-wheat bread, wheat pasta, brown rice, and whole-grain cereals such as oatmeal.    Eat a variety of protein foods such as seafood (fish and shellfish), lean meat, and poultry without skin (turkey and chicken). Examples of lean meats include pork leg, shoulder, or tenderloin, and beef round, sirloin, tenderloin, and extra lean ground beef. Other protein foods include eggs and egg substitutes, beans, peas, soy products, nuts, and seeds. Choose low-fat dairy products such as skim or 1% milk or low-fat yogurt, cheese, and cottage cheese. Limit unhealthy fats  such as butter, hard margarine, and shortening. Exercise:  Exercise at least 30 minutes per day on most days of the week. Some examples of exercise include walking, biking, dancing, and swimming. You can also fit in more physical activity by taking the stairs instead of the elevator or parking farther away from stores. Include muscle strengthening activities 2 days each week.  Regular exercise provides many health benefits. It helps you manage your weight, and decreases your risk for type 2 diabetes, heart disease, stroke, and high blood pressure. Exercise can also help improve your mood. Ask your healthcare provider about the best exercise plan for you. General health and safety guidelines:   Do not smoke. Nicotine and other chemicals in cigarettes and cigars can cause lung damage. Ask your healthcare provider for information if you currently smoke and need help to quit. E-cigarettes or smokeless tobacco still contain nicotine. Talk to your healthcare provider before you use these products. Limit alcohol. A drink of alcohol is 12 ounces of beer, 5 ounces of wine, or 1½ ounces of liquor. Lose weight, if needed. Being overweight increases your risk of certain health conditions. These include heart disease, high blood pressure, type 2 diabetes, and certain types of cancer. Protect your skin. Do not sunbathe or use tanning beds. Use sunscreen with a SPF 15 or higher. Apply sunscreen at least 15 minutes before you go outside. Reapply sunscreen every 2 hours. Wear protective clothing, hats, and sunglasses when you are outside. Drive safely. Always wear your seatbelt. Make sure everyone in your car wears a seatbelt. A seatbelt can save your life if you are in an accident. Do not use your cell phone when you are driving. This could distract you and cause an accident. Pull over if you need to make a call or send a text message. Practice safe sex. Use latex condoms if are sexually active and have more than one partner. Your healthcare provider may recommend screening tests for sexually transmitted infections (STIs). Wear helmets, lifejackets, and protective gear. Always wear a helmet when you ride a bike or motorcycle, go skiing, or play sports that could cause a head injury. Wear protective equipment when you play sports.  Wear a lifejacket when you are on a boat or doing water sports. © Copyright [facility] 2023 Information is for End User's use only and may not be sold, redistributed or otherwise used for commercial purposes. The above information is an  only. It is not intended as medical advice for individual conditions or treatments. Talk to your doctor, nurse or pharmacist before following any medical regimen to see if it is safe and effective for you.

## 2023-10-19 LAB
ALBUMIN SERPL-MCNC: 4.3 G/DL (ref 3.6–5.1)
ALBUMIN/GLOB SERPL: 1.5 (CALC) (ref 1–2.5)
ALP SERPL-CCNC: 97 U/L (ref 37–153)
ALT SERPL-CCNC: 12 U/L (ref 6–29)
AST SERPL-CCNC: 16 U/L (ref 10–35)
BASOPHILS # BLD AUTO: 50 CELLS/UL (ref 0–200)
BASOPHILS NFR BLD AUTO: 0.9 %
BILIRUB SERPL-MCNC: 0.6 MG/DL (ref 0.2–1.2)
BUN SERPL-MCNC: 14 MG/DL (ref 7–25)
BUN/CREAT SERPL: NORMAL (CALC) (ref 6–22)
CALCIUM SERPL-MCNC: 9.2 MG/DL (ref 8.6–10.4)
CHLORIDE SERPL-SCNC: 109 MMOL/L (ref 98–110)
CHOLEST SERPL-MCNC: 169 MG/DL
CHOLEST/HDLC SERPL: 3.3 (CALC)
CO2 SERPL-SCNC: 24 MMOL/L (ref 20–32)
CREAT SERPL-MCNC: 0.57 MG/DL (ref 0.5–1.05)
EOSINOPHIL # BLD AUTO: 94 CELLS/UL (ref 15–500)
EOSINOPHIL NFR BLD AUTO: 1.7 %
ERYTHROCYTE [DISTWIDTH] IN BLOOD BY AUTOMATED COUNT: 12.6 % (ref 11–15)
GFR/BSA.PRED SERPLBLD CYS-BASED-ARV: 103 ML/MIN/1.73M2
GLOBULIN SER CALC-MCNC: 2.8 G/DL (CALC) (ref 1.9–3.7)
GLUCOSE SERPL-MCNC: 84 MG/DL (ref 65–99)
HCT VFR BLD AUTO: 40.7 % (ref 35–45)
HDLC SERPL-MCNC: 51 MG/DL
HGB BLD-MCNC: 14.2 G/DL (ref 11.7–15.5)
LDLC SERPL CALC-MCNC: 96 MG/DL (CALC)
LYMPHOCYTES # BLD AUTO: 1826 CELLS/UL (ref 850–3900)
LYMPHOCYTES NFR BLD AUTO: 33.2 %
MCH RBC QN AUTO: 31.8 PG (ref 27–33)
MCHC RBC AUTO-ENTMCNC: 34.9 G/DL (ref 32–36)
MCV RBC AUTO: 91.1 FL (ref 80–100)
MONOCYTES # BLD AUTO: 424 CELLS/UL (ref 200–950)
MONOCYTES NFR BLD AUTO: 7.7 %
NEUTROPHILS # BLD AUTO: 3108 CELLS/UL (ref 1500–7800)
NEUTROPHILS NFR BLD AUTO: 56.5 %
NONHDLC SERPL-MCNC: 118 MG/DL (CALC)
PLATELET # BLD AUTO: 180 THOUSAND/UL (ref 140–400)
PMV BLD REES-ECKER: 11.6 FL (ref 7.5–12.5)
POTASSIUM SERPL-SCNC: 3.9 MMOL/L (ref 3.5–5.3)
PROT SERPL-MCNC: 7.1 G/DL (ref 6.1–8.1)
RBC # BLD AUTO: 4.47 MILLION/UL (ref 3.8–5.1)
SODIUM SERPL-SCNC: 142 MMOL/L (ref 135–146)
T4 FREE SERPL-MCNC: 1.3 NG/DL (ref 0.8–1.8)
TRIGL SERPL-MCNC: 121 MG/DL
TSH SERPL-ACNC: 1.51 MIU/L (ref 0.4–4.5)
WBC # BLD AUTO: 5.5 THOUSAND/UL (ref 3.8–10.8)

## 2023-10-20 ENCOUNTER — TELEPHONE (OUTPATIENT)
Dept: FAMILY MEDICINE CLINIC | Facility: CLINIC | Age: 63
End: 2023-10-20

## 2023-10-20 NOTE — TELEPHONE ENCOUNTER
----- Message from Aiden Heart, [address] [facility] sent at 10/20/2023  8:20 AM EDT -----  Your labs are all stable.

## 2023-10-29 NOTE — PROGRESS NOTES
[address] [facility]    NAME: Jackey Severin  AGE: 66 y.o. SEX: female  : 1960     DATE: 10/29/2023     Assessment and Plan:     Problem List Items Addressed This Visit          Endocrine    Hypothyroidism due to Hashimoto's thyroiditis    Relevant Orders    TSH, 3rd generation (Completed)    T4, free (Completed)       Other    Well adult exam - Primary    BMI 33.0-33.9,adult     Other Visit Diagnoses       Encounter for screening mammogram for breast cancer        Relevant Orders    Mammo screening bilateral w 3d & cad    Encounter for immunization        Relevant Orders    influenza vaccine, quadrivalent, 0.5 mL, preservative-free, for adult and pediatric patients 6 mos+ (AFLURIA, FLUARIX, FLULAVAL, FLUZONE) (Completed)    Encounter for screening colonoscopy        Relevant Orders    Cologuard    Screening cholesterol level        Relevant Orders    Lipid Panel with Direct LDL reflex (Completed)    Screening, anemia, deficiency, iron        Relevant Orders    CBC and differential (Completed)    Screening for endocrine disorder        Relevant Orders    Comprehensive metabolic panel (Completed)            Immunizations and preventive care screenings were discussed with patient today. Appropriate education was printed on patient's after visit summary. Counseling:  Dental Health: discussed importance of regular tooth brushing, flossing, and dental visits. Exercise: the importance of regular exercise/physical activity was discussed. Recommend exercise 3-5 times per week for at least 30 minutes. BMI Counseling: Body mass index is 33.18 kg/m². The BMI is above normal. Nutrition recommendations include decreasing portion sizes. Exercise recommendations include exercising 3-5 times per week. No pharmacotherapy was ordered. Rationale for BMI follow-up plan is due to patient being overweight or obese.      Depression Screening and Follow-up Plan: Patient was screened for depression during today's encounter. They screened negative with a PHQ-2 score of 0. No follow-ups on file. Chief Complaint:     Chief Complaint   Patient presents with    Physical Exam     Physical and flu shot   Blood work pended patient prefers quest       History of Present Illness:     Adult Annual Physical   Patient here for a comprehensive physical exam. The patient reports no problems. Diet and Physical Activity  Diet/Nutrition: well balanced diet. Exercise: walking. Depression Screening  PHQ-2/9 Depression Screening    Little interest or pleasure in doing things: 0 - not at all  Feeling down, depressed, or hopeless: 0 - not at all  Trouble falling or staying asleep, or sleeping too much: 0 - not at all  Feeling tired or having little energy: 1 - several days  Poor appetite or overeatin - not at all  Feeling bad about yourself - or that you are a failure or have let yourself or your family down: 0 - not at all  Trouble concentrating on things, such as reading the newspaper or watching television: 1 - several days  Moving or speaking so slowly that other people could have noticed. Or the opposite - being so fidgety or restless that you have been moving around a lot more than usual: 0 - not at all  Thoughts that you would be better off dead, or of hurting yourself in some way: 0 - not at all  PHQ-2 Score: 0  PHQ-2 Interpretation: Negative depression screen  PHQ-9 Score: 2   PHQ-9 Interpretation: No or Minimal depression          General Health  Sleep: sleeps well. Hearing: normal - bilateral.  Vision: no vision problems. Dental: regular dental visits. /GYN Health  Patient is: postmenopaus  Contraceptive method:  none . Advanced Care Planning  Do you have an advanced directive? no  Do you have a durable medical power of ? no     Review of Systems:     Review of Systems   Constitutional: Negative.   Negative for fatigue and fever.   HENT: Negative. Eyes: Negative. Respiratory: Negative. Negative for cough. Cardiovascular: Negative. Gastrointestinal: Negative. Endocrine: Negative. Genitourinary: Negative. Musculoskeletal: Negative. Skin: Negative. Allergic/Immunologic: Negative. Neurological: Negative. Psychiatric/Behavioral: Negative.         Past Medical History:     Past Medical History:   Diagnosis Date    Acquired hypothyroidism 11/9/2018    Depression       Past Surgical History:     Past Surgical History:   Procedure Laterality Date    BREAST BIOPSY Right 07/29/2014    US core bx    TONSILLECTOMY      childhood      Social History:     Social History     Socioeconomic History    Marital status: /Civil Union     Spouse name: None    Number of children: None    Years of education: None    Highest education level: None   Occupational History     Comment: not currently working 8/2021   Tobacco Use    Smoking status: Never    Smokeless tobacco: Never   Vaping Use    Vaping Use: Never used   Substance and Sexual Activity    Alcohol use: Yes     Comment: occaional glass of wine    Drug use: Never    Sexual activity: Not Currently     Birth control/protection: Post-menopausal   Other Topics Concern    None   Social History Narrative    None     Social Determinants of Health     Financial Resource Strain: Not on file   Food Insecurity: Not on file   Transportation Needs: Not on file   Physical Activity: Not on file   Stress: Not on file   Social Connections: Not on file   Intimate Partner Violence: Not on file   Housing Stability: Not on file      Family History:     Family History   Problem Relation Age of Onset    Asthma Mother     Heart disease Mother         scarlet fever induced valvular disease with valve replacement    Leukemia Mother     Valvular heart disease Mother     Diabetes Father         Mellitus    Pancreatic cancer Father 80    No Known Problems Son     No Known Problems Son     No Known Problems Paternal Aunt     No Known Problems Paternal Aunt       Current Medications:     Current Outpatient Medications   Medication Sig Dispense Refill    levothyroxine 75 mcg tablet Take 1 tablet (75 mcg total) by mouth daily in the early morning 90 tablet 3     No current facility-administered medications for this visit. Allergies:     No Known Allergies   Physical Exam:     /80   Pulse 93   Temp 97.6 °F (36.4 °C)   Ht 5' 2" (1.575 m)   Wt 82.3 kg (181 lb 6.4 oz)   SpO2 97%   BMI 33.18 kg/m²     Physical Exam  Vitals and nursing note reviewed. Constitutional:       Appearance: She is well-developed. HENT:      Head: Normocephalic and atraumatic. Right Ear: External ear normal.      Left Ear: External ear normal.      Nose: Nose normal.   Eyes:      Conjunctiva/sclera: Conjunctivae normal.      Pupils: Pupils are equal, round, and reactive to light. Cardiovascular:      Rate and Rhythm: Normal rate and regular rhythm. Heart sounds: Normal heart sounds. Pulmonary:      Effort: Pulmonary effort is normal.      Breath sounds: Normal breath sounds. Abdominal:      General: Bowel sounds are normal.      Palpations: Abdomen is soft. Musculoskeletal:         General: Normal range of motion. Cervical back: Normal range of motion and neck supple. Skin:     General: Skin is warm and dry. Neurological:      Mental Status: She is alert and oriented to person, place, and time. Psychiatric:         Behavior: Behavior normal.         Thought Content:  Thought content normal.         Judgment: Judgment normal.          Lachelle Mcdonald, DO  [address]

## 2023-12-09 PROBLEM — Z00.00 WELL ADULT EXAM: Status: RESOLVED | Noted: 2021-05-21 | Resolved: 2023-12-09

## 2024-04-04 ENCOUNTER — HOSPITAL ENCOUNTER (OUTPATIENT)
Dept: MAMMOGRAPHY | Facility: IMAGING CENTER | Age: 64
Discharge: HOME | End: 2024-04-04
Payer: COMMERCIAL

## 2024-04-04 VITALS — WEIGHT: 175 LBS | HEIGHT: 62 IN | BODY MASS INDEX: 32.2 KG/M2

## 2024-04-04 DIAGNOSIS — Z12.31 ENCOUNTER FOR SCREENING MAMMOGRAM FOR BREAST CANCER: ICD-10-CM

## 2024-04-04 PROCEDURE — 77067 SCR MAMMO BI INCL CAD: CPT

## 2024-04-04 PROCEDURE — 77063 BREAST TOMOSYNTHESIS BI: CPT

## 2024-04-09 ENCOUNTER — TELEPHONE (OUTPATIENT)
Dept: FAMILY MEDICINE CLINIC | Facility: CLINIC | Age: 64
End: 2024-04-09

## 2024-05-25 LAB
T4 FREE SERPL-MCNC: 1.4 NG/DL (ref 0.8–1.8)
TSH SERPL-ACNC: 2.72 MIU/L (ref 0.4–4.5)

## 2024-05-30 ENCOUNTER — OFFICE VISIT (OUTPATIENT)
Dept: ENDOCRINOLOGY | Facility: HOSPITAL | Age: 64
End: 2024-05-30
Payer: COMMERCIAL

## 2024-05-30 VITALS
HEIGHT: 62 IN | DIASTOLIC BLOOD PRESSURE: 84 MMHG | HEART RATE: 97 BPM | WEIGHT: 182 LBS | SYSTOLIC BLOOD PRESSURE: 122 MMHG | BODY MASS INDEX: 33.49 KG/M2

## 2024-05-30 DIAGNOSIS — E03.9 ACQUIRED HYPOTHYROIDISM: ICD-10-CM

## 2024-05-30 DIAGNOSIS — E06.3 HYPOTHYROIDISM DUE TO HASHIMOTO'S THYROIDITIS: Primary | ICD-10-CM

## 2024-05-30 DIAGNOSIS — E03.8 HYPOTHYROIDISM DUE TO HASHIMOTO'S THYROIDITIS: Primary | ICD-10-CM

## 2024-05-30 PROCEDURE — 99214 OFFICE O/P EST MOD 30 MIN: CPT | Performed by: INTERNAL MEDICINE

## 2024-05-30 RX ORDER — LEVOTHYROXINE SODIUM 0.07 MG/1
75 TABLET ORAL
Qty: 90 TABLET | Refills: 3 | Status: SHIPPED | OUTPATIENT
Start: 2024-05-30

## 2024-05-30 NOTE — PROGRESS NOTES
5/30/2024    Assessment & Plan      Diagnoses and all orders for this visit:    Hypothyroidism due to Hashimoto's thyroiditis    Acquired hypothyroidism  -     levothyroxine 75 mcg tablet; Take 1 tablet (75 mcg total) by mouth daily in the early morning        Assessment & Plan  1. Hypothyroidism due to Hashimoto's thyroiditis.  The patient's most recent thyroid function studies have returned normal results, indicating biochemically euthyroid status. Reassurance was provided that her symptoms of cold intolerance, fatigue, or weight issues are unlikely to be thyroid-related, as her thyroid function studies have remained normal since 2022, with no alterations in thyroid hormone dosage. However, she continues to experience these symptoms, which have not improved since the initiation of thyroid hormone therapy. It is hypothesized that some of her symptoms may be more related to depression and stress due to family issues. The patient will maintain her current regimen of levothyroxine 75 mcg daily, as there is no need for an increase. She has been advised to consult her primary care physician regarding her hypothyroidism, at least annually.    Follow-up  Follow-up visits will be scheduled as necessary, however, no scheduled follow-up has been scheduled at this time.    I have spent a total time of 30 minutes on 05/30/24 in caring for this patient including Diagnostic results, Prognosis, Risks and benefits of tx options, Instructions for management, Patient and family education, Importance of tx compliance, Risk factor reductions, Impressions, Counseling / Coordination of care, Documenting in the medical record, Reviewing / ordering tests, medicine, procedures  , and Obtaining or reviewing history  .      CC: Hypothyroid follow-up    History of Present Illness    HPI: Grace PADRON Joe is a 64-year-old female with history of hypothyroidism due to Hashimoto's thyroiditis, here for follow-up visit.    She was diagnosed  with hypothyroidism in 2018 when she complained of severe fatigue, depression, and weight gain.  Recently, thyroid function tests do show hypothyroidism in the setting depression symptoms with difficulty with others.  This improved when she quit her job.       The patient is currently on a regimen of levothyroxine 75 mcg, administered once daily, to manage her hypothyroidism. Despite this, she continues to experience symptoms, including persistent fatigue and a persistent sensation of cold. Occasional constipation is also reported. She denies experiencing tachycardia, palpitations, tremors, or tremors in her hands. Her sleep pattern is inconsistent, with some nights where she struggles to return to sleep, while other nights, it takes her over an hour to fall asleep. She aims to achieve an 8-hour sleep cycle, typically sleeping for 6.5 hours per night. She denies experiencing hyperhidrosis, xerosis, nail breakage, or hair loss. She reports mild depression, which she attributes to family-related issues. She denies diplopia or dysphagia. She reports difficulty losing weight.        Historical Information   Past Medical History:   Diagnosis Date    Acquired hypothyroidism 11/9/2018    Depression      Past Surgical History:   Procedure Laterality Date    BREAST EXCISIONAL BIOPSY Right 07/29/2014    US core bx-benign    TONSILLECTOMY      childhood     Social History   Social History     Substance and Sexual Activity   Alcohol Use Yes    Alcohol/week: 1.0 standard drink of alcohol    Types: 1 Glasses of wine per week    Comment: Occasional     Social History     Substance and Sexual Activity   Drug Use Never     Social History     Tobacco Use   Smoking Status Never   Smokeless Tobacco Never     Family History:   Family History   Problem Relation Age of Onset    Asthma Mother     Heart disease Mother         scarlet fever induced valvular disease with valve replacement    Leukemia Mother 94    Valvular heart disease Mother  "    Diabetes Father         Mellitus    Pancreatic cancer Father 82    No Known Problems Maternal Grandmother     No Known Problems Maternal Grandfather     No Known Problems Paternal Grandmother     No Known Problems Paternal Grandfather     No Known Problems Son     No Known Problems Son     No Known Problems Paternal Aunt     No Known Problems Paternal Aunt        Meds/Allergies   Current Outpatient Medications   Medication Sig Dispense Refill    levothyroxine 75 mcg tablet Take 1 tablet (75 mcg total) by mouth daily in the early morning 90 tablet 3     No current facility-administered medications for this visit.     No Known Allergies    Objective   Vitals: Blood pressure 122/84, pulse 97, height 5' 2\" (1.575 m), weight 82.6 kg (182 lb).  Invasive Devices       None                   Physical Exam  Physical exam normal with pertinent positives and negatives.    No lid lag, stare, proptosis, or periorbital edema in the HEENT.  Thyroid is normal in size. No palpable thyroid nodules in the neck.  Lungs are clear to auscultation.  Heart has a regular rate and rhythm. No murmurs.  No tremor of the outstretched hands in the musculoskeletal system.  Neurologic examination reveals patellar deep tendon reflexes are normal.      The history was obtained from the review of the chart and from the patient.    Lab Results:          Lab Results   Component Value Date    CREATININE 0.57 10/19/2023    CREATININE 0.76 05/13/2021    CREATININE 0.66 12/05/2019    BUN 14 10/19/2023    K 3.9 10/19/2023     10/19/2023    CO2 24 10/19/2023     eGFR   Date Value Ref Range Status   10/19/2023 103 > OR = 60 mL/min/1.73m2 Final         Lab Results   Component Value Date    HDL 51 10/19/2023    TRIG 121 10/19/2023    CHOLHDL 3.0 11/08/2018       Lab Results   Component Value Date    ALT 12 10/19/2023    AST 16 10/19/2023    ALKPHOS 97 10/19/2023       Lab Results   Component Value Date    TSH 2.72 05/24/2024    FREET4 1.4 05/24/2024 "             No future appointments.

## 2024-05-30 NOTE — PATIENT INSTRUCTIONS
The blood work is normal.     Continue the same levothyroxine 75 mcg daily.     I do not think the thyroid is causing the symptoms.     Discuss with Dr. Overton other options for the symptoms.    Follow up with Dr. Overton for the thyroid.

## 2024-10-16 ENCOUNTER — OFFICE VISIT (OUTPATIENT)
Dept: FAMILY MEDICINE CLINIC | Facility: CLINIC | Age: 64
End: 2024-10-16
Payer: COMMERCIAL

## 2024-10-16 VITALS
BODY MASS INDEX: 32.2 KG/M2 | DIASTOLIC BLOOD PRESSURE: 68 MMHG | OXYGEN SATURATION: 94 % | HEIGHT: 62 IN | HEART RATE: 79 BPM | WEIGHT: 175 LBS | SYSTOLIC BLOOD PRESSURE: 114 MMHG | TEMPERATURE: 97 F

## 2024-10-16 DIAGNOSIS — Z12.11 SCREEN FOR COLON CANCER: ICD-10-CM

## 2024-10-16 DIAGNOSIS — G89.29 CHRONIC PAIN OF RIGHT KNEE: ICD-10-CM

## 2024-10-16 DIAGNOSIS — M65.311 TRIGGER FINGER OF RIGHT THUMB: ICD-10-CM

## 2024-10-16 DIAGNOSIS — Z00.00 WELL ADULT EXAM: Primary | ICD-10-CM

## 2024-10-16 DIAGNOSIS — M25.50 ARTHRALGIA OF MULTIPLE JOINTS: ICD-10-CM

## 2024-10-16 DIAGNOSIS — R53.82 CHRONIC FATIGUE: ICD-10-CM

## 2024-10-16 DIAGNOSIS — M65.312 TRIGGER FINGER OF LEFT THUMB: ICD-10-CM

## 2024-10-16 DIAGNOSIS — E06.3 HYPOTHYROIDISM DUE TO HASHIMOTO'S THYROIDITIS: ICD-10-CM

## 2024-10-16 DIAGNOSIS — M25.561 CHRONIC PAIN OF RIGHT KNEE: ICD-10-CM

## 2024-10-16 DIAGNOSIS — Z23 NEED FOR INFLUENZA VACCINATION: ICD-10-CM

## 2024-10-16 DIAGNOSIS — Z82.49 FAMILY HISTORY OF HEART DISEASE: ICD-10-CM

## 2024-10-16 PROCEDURE — 99396 PREV VISIT EST AGE 40-64: CPT | Performed by: FAMILY MEDICINE

## 2024-10-16 PROCEDURE — 90471 IMMUNIZATION ADMIN: CPT

## 2024-10-16 PROCEDURE — 99214 OFFICE O/P EST MOD 30 MIN: CPT | Performed by: FAMILY MEDICINE

## 2024-10-16 PROCEDURE — 90673 RIV3 VACCINE NO PRESERV IM: CPT

## 2024-10-16 RX ORDER — LEVOTHYROXINE SODIUM 100 UG/1
100 TABLET ORAL
Qty: 100 TABLET | Refills: 3 | Status: SHIPPED | OUTPATIENT
Start: 2024-10-16

## 2024-10-16 NOTE — ASSESSMENT & PLAN NOTE
Evaluation with ortho, symptoms a few times a week, cannot move thumbs  Orders:    Ambulatory Referral to Orthopedic Surgery; Future

## 2024-10-16 NOTE — PROGRESS NOTES
Adult Annual Physical  Name: Grace Diaz      : 1960      MRN: [id number]  Encounter Provider: Amara Overton DO  Encounter Date: 10/16/2024   Encounter department: [facility]    Assessment & Plan  Well adult exam         Hypothyroidism due to Hashimoto's thyroiditis  Increase dose to 100mcg daily, check thyroid levels on new dose in about 6-8 weeks with T4 and T3  No longer seeing endocrine. Released by Dr. Pike  Orders:    TSH, 3rd generation with Free T4 reflex; Future    T4, free; Future    T3, free; Future    levothyroxine 100 mcg tablet; Take 1 tablet (100 mcg total) by mouth daily in the early morning    TSH, 3rd generation with Free T4 reflex    T4, free    T3, free    Chronic pain of right knee  Xray right knee. Ongoing pain since high school with fall in marching band directly on right knee, getting worse over time, now limiting activities, no swelling  Consider physical therapy vs ortho evaluation   Orders:    XR knee 3 vw right non injury; Future    Arthralgia of multiple joints  Rheumatologic blood work screen for temperature regulation, multiple joint pains, fatigue   Orders:    Sedimentation rate, automated; Future    SANTI Screen w/ Reflex to Titer/Pattern; Future    rheumatoid arthritis dianostic panel; Future    Sedimentation rate, automated    SANTI Screen w/ Reflex to Titer/Pattern    rheumatoid arthritis dianostic panel    Trigger finger of right thumb  Evaluation with ortho, symptoms a few times a week, cannot move thumbs  Orders:    Ambulatory Referral to Orthopedic Surgery; Future    Trigger finger of left thumb    Orders:    Ambulatory Referral to Orthopedic Surgery; Future    Need for influenza vaccination    Orders:    influenza vaccine, recombinant, PF, 0.5 mL IM (Flublok)    Chronic fatigue  Blood work due   Orders:    CBC and differential; Future    Comprehensive metabolic panel; Future    TSH, 3rd generation with Free T4 reflex; Future    T4, free;  Future    T3, free; Future    CBC and differential    Comprehensive metabolic panel    TSH, 3rd generation with Free T4 reflex    T4, free    T3, free    Family history of heart disease    Orders:    Lipid Panel with Direct LDL reflex    Screen for colon cancer    Orders:    Cologuard    BMI 32.0-32.9,adult         Immunizations and preventive care screenings were discussed with patient today. Appropriate education was printed on patient's after visit summary.    Counseling:  Dental Health: discussed importance of regular tooth brushing, flossing, and dental visits.  Exercise: the importance of regular exercise/physical activity was discussed. Recommend exercise 3-5 times per week for at least 30 minutes.       Depression Screening and Follow-up Plan: Patient was screened for depression during today's encounter. They screened negative with a PHQ-2 score of 0.        History of Present Illness     Adult Annual Physical:  Patient presents for annual physical. Pt is here for a physical   Comes on suddenly and feels freezing from inside out- fingers feel cold   Thumb freezes up - right worse than left - denies injury, has to massage them, comes on suddenly   Feeling cold and achey.   Complains of bilateral knee pain- right worse than left - history of injury in high school .     Diet and Physical Activity:  - Diet/Nutrition: well balanced diet.  - Exercise: walking, 3-4 times a week on average and less than 30 minutes on average.    Depression Screening:  - PHQ-2 Score: 0    General Health:  - Sleep: sleeps well. leg cramps at night  - Hearing: normal hearing right ear.  - Vision: no vision problems.  - Dental: regular dental visits.    /GYN Health:  - Follows with GYN: no.   - Menopause: postmenopausal.   - History of STDs: no   Complains of right knee pain- chronic injury since high school marching band and fell directly on right knee-   Review of Systems   Constitutional:  Positive for fatigue. Negative for fever.  "  HENT: Negative.     Eyes: Negative.    Respiratory: Negative.  Negative for cough.    Cardiovascular: Negative.    Gastrointestinal: Negative.    Endocrine: Positive for cold intolerance.   Genitourinary: Negative.    Musculoskeletal:  Positive for arthralgias and gait problem. Negative for joint swelling.   Skin: Negative.    Allergic/Immunologic: Negative.    Psychiatric/Behavioral: Negative.       Current Outpatient Medications on File Prior to Visit   Medication Sig Dispense Refill    [DISCONTINUED] levothyroxine 75 mcg tablet Take 1 tablet (75 mcg total) by mouth daily in the early morning 90 tablet 3     No current facility-administered medications on file prior to visit.      Social History     Tobacco Use    Smoking status: Never    Smokeless tobacco: Never   Vaping Use    Vaping status: Never Used   Substance and Sexual Activity    Alcohol use: Yes     Alcohol/week: 1.0 standard drink of alcohol     Types: 1 Glasses of wine per week     Comment: Occasional    Drug use: Never    Sexual activity: Not Currently     Partners: Male     Birth control/protection: None       Objective     /68   Pulse 79   Temp (!) 97 °F (36.1 °C) (Tympanic)   Ht 5' 2\" (1.575 m)   Wt 79.4 kg (175 lb)   SpO2 94%   BMI 32.01 kg/m²     Physical Exam  Vitals and nursing note reviewed.   Constitutional:       Appearance: She is well-developed. She is obese.   HENT:      Head: Normocephalic and atraumatic.      Right Ear: External ear normal.      Left Ear: External ear normal.      Nose: Nose normal.   Eyes:      Conjunctiva/sclera: Conjunctivae normal.      Pupils: Pupils are equal, round, and reactive to light.   Cardiovascular:      Rate and Rhythm: Normal rate and regular rhythm.      Heart sounds: Normal heart sounds.   Pulmonary:      Effort: Pulmonary effort is normal.      Breath sounds: Normal breath sounds.   Abdominal:      General: Bowel sounds are normal.      Palpations: Abdomen is soft.   Musculoskeletal:    "      General: No swelling or deformity. Normal range of motion.      Cervical back: Normal range of motion and neck supple.      Comments: History of injury of right knee in high school  Ligaments stable  Crepitance    Skin:     General: Skin is warm and dry.   Neurological:      Mental Status: She is alert and oriented to person, place, and time.   Psychiatric:         Behavior: Behavior normal.         Thought Content: Thought content normal.         Judgment: Judgment normal.

## 2024-10-16 NOTE — ASSESSMENT & PLAN NOTE
Rheumatologic blood work screen for temperature regulation, multiple joint pains, fatigue   Orders:    Sedimentation rate, automated; Future    SANTI Screen w/ Reflex to Titer/Pattern; Future    rheumatoid arthritis dianostic panel; Future    Sedimentation rate, automated    SANTI Screen w/ Reflex to Titer/Pattern    rheumatoid arthritis dianostic panel

## 2024-10-16 NOTE — ASSESSMENT & PLAN NOTE
Xray right knee. Ongoing pain since high school with fall in marching band directly on right knee, getting worse over time, now limiting activities, no swelling  Consider physical therapy vs ortho evaluation   Orders:    XR knee 3 vw right non injury; Future

## 2024-10-16 NOTE — ASSESSMENT & PLAN NOTE
Increase dose to 100mcg daily, check thyroid levels on new dose in about 6-8 weeks with T4 and T3  No longer seeing endocrine. Released by Dr. Pike  Orders:    TSH, 3rd generation with Free T4 reflex; Future    T4, free; Future    T3, free; Future    levothyroxine 100 mcg tablet; Take 1 tablet (100 mcg total) by mouth daily in the early morning    TSH, 3rd generation with Free T4 reflex    T4, free    T3, free

## 2024-10-16 NOTE — ASSESSMENT & PLAN NOTE
Blood work due   Orders:    CBC and differential; Future    Comprehensive metabolic panel; Future    TSH, 3rd generation with Free T4 reflex; Future    T4, free; Future    T3, free; Future    CBC and differential    Comprehensive metabolic panel    TSH, 3rd generation with Free T4 reflex    T4, free    T3, free

## 2024-10-18 ENCOUNTER — TELEPHONE (OUTPATIENT)
Dept: FAMILY MEDICINE CLINIC | Facility: CLINIC | Age: 64
End: 2024-10-18

## 2024-10-18 ENCOUNTER — HOSPITAL ENCOUNTER (OUTPATIENT)
Dept: RADIOLOGY | Facility: HOSPITAL | Age: 64
End: 2024-10-18
Payer: COMMERCIAL

## 2024-10-18 DIAGNOSIS — M25.561 CHRONIC PAIN OF RIGHT KNEE: ICD-10-CM

## 2024-10-18 DIAGNOSIS — G89.29 CHRONIC PAIN OF RIGHT KNEE: ICD-10-CM

## 2024-10-18 PROCEDURE — 73562 X-RAY EXAM OF KNEE 3: CPT

## 2024-10-18 NOTE — TELEPHONE ENCOUNTER
Patient aware of results and verbalized understanding.     ----- Message from Amara Overton DO sent at 10/18/2024  4:14 PM EDT -----  Xray shows moderately severe arthritis on the inside aspect of the right knee.  I would recommend an orthopedic evaluation  [facility] orth[facility] [phone]

## 2024-10-24 LAB
ALBUMIN SERPL-MCNC: 4.3 G/DL (ref 3.6–5.1)
ALBUMIN/GLOB SERPL: 1.5 (CALC) (ref 1–2.5)
ALP SERPL-CCNC: 94 U/L (ref 37–153)
ALT SERPL-CCNC: 15 U/L (ref 6–29)
ANA SER QL IF: NEGATIVE
AST SERPL-CCNC: 17 U/L (ref 10–35)
BASOPHILS # BLD AUTO: 38 CELLS/UL (ref 0–200)
BASOPHILS NFR BLD AUTO: 0.7 %
BILIRUB SERPL-MCNC: 0.6 MG/DL (ref 0.2–1.2)
BUN SERPL-MCNC: 13 MG/DL (ref 7–25)
BUN/CREAT SERPL: NORMAL (CALC) (ref 6–22)
CALCIUM SERPL-MCNC: 9.5 MG/DL (ref 8.6–10.4)
CCP IGG SERPL-ACNC: <16 UNITS
CHLORIDE SERPL-SCNC: 105 MMOL/L (ref 98–110)
CHOLEST SERPL-MCNC: 161 MG/DL
CHOLEST/HDLC SERPL: 3 (CALC)
CO2 SERPL-SCNC: 26 MMOL/L (ref 20–32)
CREAT SERPL-MCNC: 0.68 MG/DL (ref 0.5–1.05)
ENA SS-A AB SER IA-ACNC: <1 AI
ENA SS-B AB SER IA-ACNC: <1 AI
EOSINOPHIL # BLD AUTO: 113 CELLS/UL (ref 15–500)
EOSINOPHIL NFR BLD AUTO: 2.1 %
ERYTHROCYTE [DISTWIDTH] IN BLOOD BY AUTOMATED COUNT: 12.6 % (ref 11–15)
ERYTHROCYTE [SEDIMENTATION RATE] IN BLOOD BY WESTERGREN METHOD: 25 MM/H
GFR/BSA.PRED SERPLBLD CYS-BASED-ARV: 98 ML/MIN/1.73M2
GLOBULIN SER CALC-MCNC: 2.8 G/DL (CALC) (ref 1.9–3.7)
GLUCOSE SERPL-MCNC: 90 MG/DL (ref 65–99)
HCT VFR BLD AUTO: 44 % (ref 35–45)
HDLC SERPL-MCNC: 54 MG/DL
HGB BLD-MCNC: 14.1 G/DL (ref 11.7–15.5)
LDLC SERPL CALC-MCNC: 87 MG/DL (CALC)
LYMPHOCYTES # BLD AUTO: 1852 CELLS/UL (ref 850–3900)
LYMPHOCYTES NFR BLD AUTO: 34.3 %
MCH RBC QN AUTO: 30.7 PG (ref 27–33)
MCHC RBC AUTO-ENTMCNC: 32 G/DL (ref 32–36)
MCV RBC AUTO: 95.9 FL (ref 80–100)
MONOCYTES # BLD AUTO: 410 CELLS/UL (ref 200–950)
MONOCYTES NFR BLD AUTO: 7.6 %
NEUTROPHILS # BLD AUTO: 2986 CELLS/UL (ref 1500–7800)
NEUTROPHILS NFR BLD AUTO: 55.3 %
NONHDLC SERPL-MCNC: 107 MG/DL (CALC)
PLATELET # BLD AUTO: 177 THOUSAND/UL (ref 140–400)
PMV BLD REES-ECKER: 10.8 FL (ref 7.5–12.5)
POTASSIUM SERPL-SCNC: 4.1 MMOL/L (ref 3.5–5.3)
PROT SERPL-MCNC: 7.1 G/DL (ref 6.1–8.1)
RBC # BLD AUTO: 4.59 MILLION/UL (ref 3.8–5.1)
RF IGA SER-ACNC: <5 U
RF IGG SER-ACNC: <5 U
RF IGM SER-ACNC: <5 U
SODIUM SERPL-SCNC: 140 MMOL/L (ref 135–146)
T3FREE SERPL-MCNC: 3.1 PG/ML (ref 2.3–4.2)
T4 FREE SERPL-MCNC: 1.4 NG/DL (ref 0.8–1.8)
TRIGL SERPL-MCNC: 108 MG/DL
TSH SERPL-ACNC: 1.07 MIU/L (ref 0.4–4.5)
WBC # BLD AUTO: 5.4 THOUSAND/UL (ref 3.8–10.8)

## 2024-10-30 ENCOUNTER — OFFICE VISIT (OUTPATIENT)
Dept: OBGYN CLINIC | Facility: CLINIC | Age: 64
End: 2024-10-30
Payer: COMMERCIAL

## 2024-10-30 ENCOUNTER — APPOINTMENT (OUTPATIENT)
Dept: RADIOLOGY | Facility: CLINIC | Age: 64
End: 2024-10-30
Payer: COMMERCIAL

## 2024-10-30 VITALS
SYSTOLIC BLOOD PRESSURE: 120 MMHG | DIASTOLIC BLOOD PRESSURE: 82 MMHG | BODY MASS INDEX: 32.94 KG/M2 | HEIGHT: 62 IN | WEIGHT: 179 LBS

## 2024-10-30 DIAGNOSIS — M25.561 CHRONIC PAIN OF RIGHT KNEE: ICD-10-CM

## 2024-10-30 DIAGNOSIS — G89.29 CHRONIC PAIN OF RIGHT KNEE: ICD-10-CM

## 2024-10-30 DIAGNOSIS — M17.11 PRIMARY OSTEOARTHRITIS OF RIGHT KNEE: Primary | ICD-10-CM

## 2024-10-30 PROCEDURE — 99204 OFFICE O/P NEW MOD 45 MIN: CPT | Performed by: ORTHOPAEDIC SURGERY

## 2024-10-30 PROCEDURE — 73560 X-RAY EXAM OF KNEE 1 OR 2: CPT

## 2024-10-30 NOTE — PROGRESS NOTES
Assessment:     1. Primary osteoarthritis of right knee    2. Chronic pain of right knee        Plan:     Problem List Items Addressed This Visit          Musculoskeletal and Integument    Primary osteoarthritis of right knee - Primary     Findings consistent with moderate to severe right knee osteoarthritis.  Discussed findings and treatment options with the patient.  I reviewed patient's right knee x-ray and radiology report with her.  I discussed prognosis of her knee condition.  I recommend patient to take over-the-counter NSAIDS and use Voltaren gel or Aspercreme to relieve pain.  I encourage low impact activties such as staionary bike and swimming. Avoid high impact activities, squatting, pivoting, and repetitive climbing. If pain becomes more constant, I would recommend cortisone or joint supplement injection for pain relief. Discussed that if injections also provide no relief knee replacement would be the next option.  All patient's questions were answered to satisfaction.  This note is created using dictation transcription.  It may contain typographical errors, grammatical errors, improperly dictated words, background noise and other errors.            Surgery/Wound/Pain    Chronic pain of right knee    Relevant Orders    XR knee 1 or 2 vw right      Subjective:     Patient ID: Grace Diaz is a 64 y.o. female.  Chief Complaint:  64-year-old female presents today for right knee pain.  Patient is referred here by her PCP, Dr. Overton.  Patient denies injury recently. In high school had an injury, during marching band when she slipped on grass and landed on her knee. Never seen for the injury by a Dr. No PT, injections, or surgeries. Bothered by stairs and feels clicking sensation. Reports fellings of instability. Takes advil when needed. Describes as throbbing pain inside her knee, sharp pain when going down steps anteriorly.       Allergy:  No Known Allergies  Medications:  all current active meds  have been reviewed  Past Medical History:  Past Medical History:   Diagnosis Date    Acquired hypothyroidism 11/9/2018    Depression      Past Surgical History:  Past Surgical History:   Procedure Laterality Date    BREAST EXCISIONAL BIOPSY Right 07/29/2014    US core bx-benign    TONSILLECTOMY      childhood     Family History:  Family History   Problem Relation Age of Onset    Asthma Mother     Heart disease Mother         scarlet fever induced valvular disease with valve replacement    Leukemia Mother 94    Valvular heart disease Mother     Diabetes Father         Mellitus    Pancreatic cancer Father 82    No Known Problems Maternal Grandmother     No Known Problems Maternal Grandfather     No Known Problems Paternal Grandmother     No Known Problems Paternal Grandfather     No Known Problems Son     No Known Problems Son     No Known Problems Paternal Aunt     No Known Problems Paternal Aunt      Social History:  Social History     Substance and Sexual Activity   Alcohol Use Yes    Alcohol/week: 1.0 standard drink of alcohol    Types: 1 Glasses of wine per week    Comment: Occasional     Social History     Substance and Sexual Activity   Drug Use Never     Social History     Tobacco Use   Smoking Status Never   Smokeless Tobacco Never     Review of Systems   Constitutional: Negative.    HENT: Negative.     Eyes: Negative.    Respiratory: Negative.     Cardiovascular: Negative.    Gastrointestinal: Negative.    Endocrine: Negative.    Genitourinary: Negative.    Musculoskeletal:  Positive for arthralgias (Right knee). Negative for gait problem and joint swelling.   Skin: Negative.    Allergic/Immunologic: Negative.    Hematological: Negative.    Psychiatric/Behavioral: Negative.           Objective:  BP Readings from Last 1 Encounters:   10/30/24 120/82      Wt Readings from Last 1 Encounters:   10/30/24 81.2 kg (179 lb)      BMI:   Estimated body mass index is 32.74 kg/m² as calculated from the following:     "Height as of this encounter: 5' 2\" (1.575 m).    Weight as of this encounter: 81.2 kg (179 lb).  BSA:   Estimated body surface area is 1.82 meters squared as calculated from the following:    Height as of this encounter: 5' 2\" (1.575 m).    Weight as of this encounter: 81.2 kg (179 lb).   Physical Exam  Vitals and nursing note reviewed.   Constitutional:       Appearance: Normal appearance. She is well-developed.   HENT:      Head: Normocephalic and atraumatic.      Right Ear: External ear normal.      Left Ear: External ear normal.   Eyes:      Extraocular Movements: Extraocular movements intact.      Conjunctiva/sclera: Conjunctivae normal.   Pulmonary:      Effort: Pulmonary effort is normal.   Musculoskeletal:      Cervical back: Neck supple.      Right knee: No effusion.      Instability Tests: Medial Jevon test negative and lateral Jevon test negative.   Skin:     General: Skin is warm and dry.   Neurological:      Mental Status: She is alert and oriented to person, place, and time.      Deep Tendon Reflexes: Reflexes are normal and symmetric.   Psychiatric:         Mood and Affect: Mood normal.         Behavior: Behavior normal.       Right Knee Exam     Muscle Strength   The patient has normal right knee strength.    Tenderness   The patient is experiencing no tenderness.     Range of Motion   Extension:  normal   Flexion:  normal     Tests   Jevon:  Medial - negative Lateral - negative  Varus: negative Valgus: negative  Patellar apprehension: negative    Other   Erythema: absent  Scars: absent  Sensation: normal  Pulse: present  Swelling: none  Effusion: no effusion present    Comments:  Patellofemoral joint crepitation with knee motion  Varus alignment            I have personally reviewed pertinent films in PACS and my interpretation is x-ray right knee show moderate to severe osteoarthritis in the medial and patellofemoral compartment close to bone-on-bone.  Marginal osteophytes.  Varus " alignment.    Scribe Attestation      I,:  Sailaja Kim am acting as a scribe while in the presence of the attending physician.:       I,:  Justen Palacios MD personally performed the services described in this documentation    as scribed in my presence.:

## 2024-10-30 NOTE — ASSESSMENT & PLAN NOTE
Findings consistent with moderate to severe right knee osteoarthritis.  Discussed findings and treatment options with the patient.  I reviewed patient's right knee x-ray and radiology report with her.  I discussed prognosis of her knee condition.  I recommend patient to take over-the-counter NSAIDS and use Voltaren gel or Aspercreme to relieve pain.  I encourage low impact activties such as staionary bike and swimming. Avoid high impact activities, squatting, pivoting, and repetitive climbing. If pain becomes more constant, I would recommend cortisone or joint supplement injection for pain relief. Discussed that if injections also provide no relief knee replacement would be the next option.  All patient's questions were answered to satisfaction.  This note is created using dictation transcription.  It may contain typographical errors, grammatical errors, improperly dictated words, background noise and other errors.

## 2024-11-04 ENCOUNTER — OFFICE VISIT (OUTPATIENT)
Dept: OBGYN CLINIC | Facility: CLINIC | Age: 64
End: 2024-11-04
Payer: COMMERCIAL

## 2024-11-04 ENCOUNTER — APPOINTMENT (OUTPATIENT)
Dept: RADIOLOGY | Facility: CLINIC | Age: 64
End: 2024-11-04
Payer: COMMERCIAL

## 2024-11-04 DIAGNOSIS — M79.646 THUMB PAIN, UNSPECIFIED LATERALITY: ICD-10-CM

## 2024-11-04 DIAGNOSIS — M18.0 PRIMARY OSTEOARTHRITIS OF BOTH FIRST CARPOMETACARPAL JOINTS: Primary | ICD-10-CM

## 2024-11-04 PROCEDURE — 99213 OFFICE O/P EST LOW 20 MIN: CPT | Performed by: ORTHOPAEDIC SURGERY

## 2024-11-04 PROCEDURE — 73130 X-RAY EXAM OF HAND: CPT

## 2024-11-04 PROCEDURE — 20600 DRAIN/INJ JOINT/BURSA W/O US: CPT | Performed by: ORTHOPAEDIC SURGERY

## 2024-11-04 RX ADMIN — LIDOCAINE HYDROCHLORIDE 0.5 ML: 10 INJECTION, SOLUTION INFILTRATION; PERINEURAL at 15:00

## 2024-11-04 RX ADMIN — BETAMETHASONE SODIUM PHOSPHATE AND BETAMETHASONE ACETATE 3 MG: 3; 3 INJECTION, SUSPENSION INTRA-ARTICULAR; INTRALESIONAL; INTRAMUSCULAR; SOFT TISSUE at 15:00

## 2024-11-04 NOTE — PROGRESS NOTES
ASSESSMENT/PLAN:    Assessment:   Bilateral thumb CMC osteoarthritis    Plan:   -Explained the stiffness and freezing of her thumbs is secondary to her osteoarthritis as she has no triggering on exam today and her pain was located grossly at the CMC joint and the dorsal aspect of the hand.  -CSI of the right thumb CMC joint performed. Patient tolerated procedure well.  Explained injections can be performed every 3 months. Patient was instructed to call if she would like a CSI of the left thumb CMC joint.  -Topical Voltaren gel, lidocaine cream, Aspercreme, Australian Dream Cream.    Follow Up:  3  month(s)    To Do Next Visit:  Repeat CSI right thumb    General Discussions:  CMC Arthritis: The anatomy and physiology of carpometacarpal joint arthritis was discussed with the patient today in the office.  Deterioration of the articular cartilage eventually leads to hypermobility at the thumb CMC joint, resulting in joint subluxation, osteophyte formation, cystic changes within the trapezium and base of the first metacarpal, as well as subchondral sclerosis.  Eventually, pain, limited mobility, and compensatory hyperextension at the metacarpophalangeal joint may develop.  While normal activity and usage of the thumb joint may provide a painful experience to the patient, this typically does not result in damage to the thumb or hand.  Treatment options include resting thumb spica splints to decreased joint edema, pain, and inflammation.  Therapy exercises to strengthen the thenar musculature may relieve pain, but do not alter the overall continued development of osteoarthritis.  Oral medications, topical medications, corticosteroid injections may decrease pain and increase overall function.  Eventually, approximately 5% of patients may require surgical intervention.         _____________________________________________________  CHIEF COMPLAINT:  Chief Complaint   Patient presents with    Right Hand - Pain, Locking    Left  Hand - Pain, Locking         SUBJECTIVE:  Grace Diaz is a 64 y.o. female who presents with bilateral thumb pain and locking that began approximately 3 to 4 months ago.  Patient reports prior history of arthritis and impingement in the right thumb extensor compartment she was treated for with a custom thumb spica brace.  Patient reports her bilateral thumbs freeze on her at rest such as when riding as a passenger in the car on the way home from a long trip and with activity.  Patient denies any numbness or tingling.  Patient denies neck pain or weakness.    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    PAST MEDICAL HISTORY:  Past Medical History:   Diagnosis Date    Acquired hypothyroidism 11/9/2018    Depression        PAST SURGICAL HISTORY:  Past Surgical History:   Procedure Laterality Date    BREAST EXCISIONAL BIOPSY Right 07/29/2014     core bx-benign    TONSILLECTOMY      childhood       FAMILY HISTORY:  Family History   Problem Relation Age of Onset    Asthma Mother     Heart disease Mother         scarlet fever induced valvular disease with valve replacement    Leukemia Mother 94    Valvular heart disease Mother     Diabetes Father         Mellitus    Pancreatic cancer Father 82    No Known Problems Maternal Grandmother     No Known Problems Maternal Grandfather     No Known Problems Paternal Grandmother     No Known Problems Paternal Grandfather     No Known Problems Son     No Known Problems Son     No Known Problems Paternal Aunt     No Known Problems Paternal Aunt        SOCIAL HISTORY:  Social History     Tobacco Use    Smoking status: Never    Smokeless tobacco: Never   Vaping Use    Vaping status: Never Used   Substance Use Topics    Alcohol use: Yes     Alcohol/week: 1.0 standard drink of alcohol     Types: 1 Glasses of wine per  "week     Comment: Occasional    Drug use: Never       MEDICATIONS:    Current Outpatient Medications:     levothyroxine 100 mcg tablet, Take 1 tablet (100 mcg total) by mouth daily in the early morning, Disp: 100 tablet, Rfl: 3    ALLERGIES:  No Known Allergies    REVIEW OF SYSTEMS:  Pertinent items are noted in HPI.  A comprehensive review of systems was negative.    LABS:  HgA1c: No results found for: \"HGBA1C\"  BMP:   Lab Results   Component Value Date    CALCIUM 9.5 10/21/2024    K 4.1 10/21/2024    CO2 26 10/21/2024     10/21/2024    BUN 13 10/21/2024    CREATININE 0.68 10/21/2024         _____________________________________________________  PHYSICAL EXAMINATION:  Vital signs: There were no vitals taken for this visit.  General: well developed and well nourished, alert, oriented times 3, and appears comfortable  Psychiatric: Normal  HEENT: Trachea Midline, No torticollis  Cardiovascular: No discernable arrhythmia  Pulmonary: No wheezing or stridor  Abdomen: No rebound or guarding  Extremities: No peripheral edema  Skin: No masses, erythema, lacerations, fluctation, ulcerations  Neurovascular: Sensation Intact to the Median, Ulnar, Radial Nerve, Motor Intact to the Median, Ulnar, Radial Nerve, and Pulses Intact    MUSCULOSKELETAL EXAMINATION:  LEFT SIDE:  CMC: No Tenderness to STT, Negative Shoulder Sign, No Instability, Positive grind, and grossly positive tendnerness CMC; and Finger:  No Triggering  thumb. No signs of deQuervain's.  RIGHT SIDE:  CMC: Negative Grind, No Tenderness to STT, Negative Shoulder Sign, No Instability, and grossly positive tendnerness CMC; and Finger:  No Triggering  thumb. No signs of deQuervain's.    _____________________________________________________  STUDIES REVIEWED:  X-rays of the bilateral hands were obtained during today's visit, reviewed by Dr. Ta, and reviewed with the patient which demonstrates bilateral CMC arthritis worse in the right with bone-on-bone joint " contact, large osteophyte in the right thumb CMC joint and small osteophyte in the left CMC joint.      PROCEDURES PERFORMED:  Small joint arthrocentesis: R thumb CMC  [place] Protocol:  procedure performed by consultantConsent: Verbal consent obtained.  Risks and benefits: risks, benefits and alternatives were discussed  Consent given by: patient  Timeout called at: 11/4/2024 3:19 PM.  Patient understanding: patient states understanding of the procedure being performed  Patient identity confirmed: verbally with patient  Supporting Documentation  Indications: pain   Procedure Details  Location: thumb - R thumb CMC  Needle size: 25 G  Ultrasound guidance: no  Medications administered: 0.5 mL lidocaine 1 %; 3 mg betamethasone acetate-betamethasone sodium phosphate 6 (3-3) mg/mL    Patient tolerance: patient tolerated the procedure well with no immediate complications          Scribe Attestation      I,:  Josseline Lawrence am acting as a scribe while in the presence of the attending physician.:       I,:  Jason Ta MD personally performed the services described in this documentation    as scribed in my presence.:

## 2024-11-05 DIAGNOSIS — E06.3 HYPOTHYROIDISM DUE TO HASHIMOTO'S THYROIDITIS: Primary | ICD-10-CM

## 2024-11-06 RX ORDER — BETAMETHASONE SODIUM PHOSPHATE AND BETAMETHASONE ACETATE 3; 3 MG/ML; MG/ML
3 INJECTION, SUSPENSION INTRA-ARTICULAR; INTRALESIONAL; INTRAMUSCULAR; SOFT TISSUE
Status: COMPLETED | OUTPATIENT
Start: 2024-11-04 | End: 2024-11-04

## 2024-11-06 RX ORDER — LIDOCAINE HYDROCHLORIDE 10 MG/ML
0.5 INJECTION, SOLUTION INFILTRATION; PERINEURAL
Status: COMPLETED | OUTPATIENT
Start: 2024-11-04 | End: 2024-11-04

## 2024-11-15 PROBLEM — Z00.00 WELL ADULT EXAM: Status: RESOLVED | Noted: 2021-05-21 | Resolved: 2024-11-15

## 2024-12-10 ENCOUNTER — TELEPHONE (OUTPATIENT)
Age: 64
End: 2024-12-10

## 2024-12-19 ENCOUNTER — RESULTS FOLLOW-UP (OUTPATIENT)
Dept: FAMILY MEDICINE CLINIC | Facility: CLINIC | Age: 64
End: 2024-12-19

## 2024-12-19 ENCOUNTER — TELEPHONE (OUTPATIENT)
Age: 64
End: 2024-12-19

## 2024-12-19 DIAGNOSIS — E06.3 HYPOTHYROIDISM DUE TO HASHIMOTO'S THYROIDITIS: ICD-10-CM

## 2024-12-19 LAB
T4 FREE SERPL-MCNC: 1.7 NG/DL (ref 0.8–1.8)
TSH SERPL-ACNC: 0.1 MIU/L (ref 0.4–4.5)

## 2024-12-19 RX ORDER — LEVOTHYROXINE SODIUM 75 UG/1
75 TABLET ORAL
Qty: 100 TABLET | Refills: 0 | Status: SHIPPED | OUTPATIENT
Start: 2024-12-19

## 2024-12-19 NOTE — TELEPHONE ENCOUNTER
Patient has questions regarding her recent lab work to her check her thyroid and has medication questions.  She would like someone to get in touch with her to let her know what to do?  Please advise

## 2024-12-20 NOTE — TELEPHONE ENCOUNTER
----- Message from Amara Overton DO sent at 12/19/2024 10:48 PM EST -----  You are getting slightly too much thyroid supplement.  I sent a new prescription for 75mcg [facility]roxine [facility].   You can start that dose and recheck your levels in 6-8 weeks. I put a new order in for [facility] and it does not need to be fasting.

## 2025-01-22 ENCOUNTER — RESULTS FOLLOW-UP (OUTPATIENT)
Dept: FAMILY MEDICINE CLINIC | Facility: CLINIC | Age: 65
End: 2025-01-22

## 2025-01-22 LAB — COLOGUARD RESULT REPORTABLE: NEGATIVE

## 2025-01-25 LAB
T4 FREE SERPL-MCNC: 1.4 NG/DL (ref 0.8–1.8)
TSH SERPL-ACNC: 2.07 MIU/L (ref 0.4–4.5)

## 2025-01-29 ENCOUNTER — OFFICE VISIT (OUTPATIENT)
Dept: FAMILY MEDICINE CLINIC | Facility: CLINIC | Age: 65
End: 2025-01-29
Payer: COMMERCIAL

## 2025-01-29 VITALS
OXYGEN SATURATION: 97 % | WEIGHT: 174.8 LBS | HEIGHT: 62 IN | TEMPERATURE: 98.2 F | DIASTOLIC BLOOD PRESSURE: 70 MMHG | BODY MASS INDEX: 32.17 KG/M2 | SYSTOLIC BLOOD PRESSURE: 116 MMHG | HEART RATE: 101 BPM

## 2025-01-29 DIAGNOSIS — R53.82 CHRONIC FATIGUE: ICD-10-CM

## 2025-01-29 DIAGNOSIS — E06.3 HYPOTHYROIDISM DUE TO HASHIMOTO'S THYROIDITIS: Primary | ICD-10-CM

## 2025-01-29 DIAGNOSIS — E03.9 ACQUIRED HYPOTHYROIDISM: ICD-10-CM

## 2025-01-29 PROBLEM — Z23 NEED FOR INFLUENZA VACCINATION: Status: RESOLVED | Noted: 2024-10-16 | Resolved: 2025-01-29

## 2025-01-29 PROCEDURE — 99214 OFFICE O/P EST MOD 30 MIN: CPT | Performed by: FAMILY MEDICINE

## 2025-01-29 RX ORDER — LIOTHYRONINE SODIUM 5 UG/1
5 TABLET ORAL DAILY
Qty: 30 TABLET | Refills: 1 | Status: SHIPPED | OUTPATIENT
Start: 2025-01-29

## 2025-01-29 NOTE — PROGRESS NOTES
Name: Grace Diaz      : 1960      MRN: [id number]  Encounter Provider: Amara Overton DO  Encounter Date: 2025   Encounter department: [facility]  :  Assessment & Plan  Hypothyroidism due to Hashimoto's thyroiditis  Pt had recent blood work .   Tsh 2.07   T4 2.4  Pt will continue levothyroxine 75mcg and still experiencing symptoms. Pt has upcoming appointment with endocrine.  Discussed options of increasing levothyroxine vs add low dose liothyronine and recheck tsh, T3, T4 in 6-8 weeks            Chronic fatigue         Acquired hypothyroidism    Orders:    liothyronine (CYTOMEL) 5 mcg tablet; Take 1 tablet (5 mcg total) by mouth daily    TSH, 3rd generation with Free T4 reflex; Future    T3, free; Future    T4, free; Future    BMI 31.0-31.9,adult               Depression Screening and Follow-up Plan: Patient was screened for depression during today's encounter. They screened negative with a PHQ-2 score of 0.      History of Present Illness   Pt complains of feeling tired. Pt complains of cold intolerance   Intermittent, off balance sensation and feels like a deep cold- feels like she is in slow motion- usually in the evening  Symptoms occur 1-2 days out of the month.   Pt has an upcoming appointment with Dr. Vicente Doherty [facility]  [phone] [facility]   Pt has Good diet, sleeping ok- falls asleep ok but wakes up at night.   She gets Headaches with stress,         Review of Systems   Constitutional:  Positive for fatigue. Negative for fever.   HENT: Negative.     Eyes: Negative.    Respiratory: Negative.  Negative for cough.    Cardiovascular: Negative.    Gastrointestinal: Negative.    Endocrine: Positive for cold intolerance.   Genitourinary: Negative.    Musculoskeletal: Negative.    Skin: Negative.    Allergic/Immunologic: Negative.    Neurological:  Positive for headaches.   Psychiatric/Behavioral: Negative.         Objective   BP  "116/70   Pulse 101   Temp 98.2 °F (36.8 °C) (Tympanic)   Ht 5' 2\" (1.575 m)   Wt 79.3 kg (174 lb 12.8 oz)   SpO2 97%   BMI 31.97 kg/m²      Physical Exam  Vitals and nursing note reviewed.   Constitutional:       Appearance: She is well-developed. She is obese.   HENT:      Head: Normocephalic and atraumatic.   Cardiovascular:      Rate and Rhythm: Normal rate and regular rhythm.      Heart sounds: Normal heart sounds.   Pulmonary:      Effort: Pulmonary effort is normal.      Breath sounds: Normal breath sounds.   Abdominal:      General: Bowel sounds are normal.      Palpations: Abdomen is soft.   Skin:     General: Skin is warm and dry.   Neurological:      Mental Status: She is alert and oriented to person, place, and time.   Psychiatric:         Behavior: Behavior normal.         Thought Content: Thought content normal.         Judgment: Judgment normal.         "

## 2025-01-29 NOTE — PATIENT INSTRUCTIONS
Levothyroxine 75mcg 1 tab daily  Liothyronine 5mcg 1 tab daily  Recheck thyroid levels in 6 weeks   Keep appt with endocrine for further evaluation

## 2025-02-06 DIAGNOSIS — E06.3 HYPOTHYROIDISM DUE TO HASHIMOTO'S THYROIDITIS: ICD-10-CM

## 2025-02-07 ENCOUNTER — NURSE TRIAGE (OUTPATIENT)
Age: 65
End: 2025-02-07

## 2025-02-07 ENCOUNTER — TELEPHONE (OUTPATIENT)
Age: 65
End: 2025-02-07

## 2025-02-07 DIAGNOSIS — E06.3 HYPOTHYROIDISM DUE TO HASHIMOTO'S THYROIDITIS: ICD-10-CM

## 2025-02-07 RX ORDER — LEVOTHYROXINE SODIUM 75 UG/1
75 TABLET ORAL
Qty: 100 TABLET | Refills: 0 | Status: SHIPPED | OUTPATIENT
Start: 2025-02-07 | End: 2025-02-10 | Stop reason: SDUPTHER

## 2025-02-07 RX ORDER — LEVOTHYROXINE SODIUM 75 UG/1
75 TABLET ORAL
Qty: 100 TABLET | Refills: 0 | Status: SHIPPED | OUTPATIENT
Start: 2025-02-07 | End: 2025-02-07 | Stop reason: SDUPTHER

## 2025-02-07 NOTE — TELEPHONE ENCOUNTER
levothyroxine 75 mcg tablet should go to the pharmacy to [facility] - [place] [place] - [address] [facility]  for a 90 day supply not [facility]. Please assist the patient with changing the pharmacy for the medication patient has not  the medication. Thank you

## 2025-02-07 NOTE — TELEPHONE ENCOUNTER
"Unable to finalize as telling me there is a duplicate medication. Please advise.           Reason for Disposition   Prescription prescribed recently is not at pharmacy and triager has access to patient's EMR and prescription is recorded in the EMR    Answer Assessment - Initial Assessment Questions  1. NAME of MEDICINE: \"What medicine(s) are you calling about?\"      levothyroxine  2. QUESTION: \"What is your question?\" (e.g., double dose of medicine, side effect)      WRONG PHARMACY   3. PRESCRIBER: \"Who prescribed the medicine?\" Reason: if prescribed by specialist, call should be referred to that group.      PCP    Protocols used: Medication Question Call-Adult-[place]    "

## 2025-02-08 PROBLEM — E03.9 ACQUIRED HYPOTHYROIDISM: Status: ACTIVE | Noted: 2025-02-08

## 2025-02-08 NOTE — ASSESSMENT & PLAN NOTE
Pt had recent blood work 1/24.   Tsh 2.07   T4 2.4  Pt will continue levothyroxine 75mcg and still experiencing symptoms. Pt has upcoming appointment with endocrine.  Discussed options of increasing levothyroxine vs add low dose liothyronine and recheck tsh, T3, T4 in 6-8 weeks

## 2025-02-08 NOTE — ASSESSMENT & PLAN NOTE
Orders:    liothyronine (CYTOMEL) 5 mcg tablet; Take 1 tablet (5 mcg total) by mouth daily    TSH, 3rd generation with Free T4 reflex; Future    T3, free; Future    T4, free; Future

## 2025-02-10 ENCOUNTER — OFFICE VISIT (OUTPATIENT)
Dept: OBGYN CLINIC | Facility: CLINIC | Age: 65
End: 2025-02-10
Payer: COMMERCIAL

## 2025-02-10 VITALS — WEIGHT: 175.8 LBS | BODY MASS INDEX: 32.35 KG/M2 | HEIGHT: 62 IN

## 2025-02-10 DIAGNOSIS — M18.0 PRIMARY OSTEOARTHRITIS OF BOTH FIRST CARPOMETACARPAL JOINTS: Primary | ICD-10-CM

## 2025-02-10 DIAGNOSIS — E06.3 HYPOTHYROIDISM DUE TO HASHIMOTO'S THYROIDITIS: ICD-10-CM

## 2025-02-10 PROCEDURE — 99213 OFFICE O/P EST LOW 20 MIN: CPT | Performed by: ORTHOPAEDIC SURGERY

## 2025-02-10 PROCEDURE — 20600 DRAIN/INJ JOINT/BURSA W/O US: CPT | Performed by: ORTHOPAEDIC SURGERY

## 2025-02-10 RX ORDER — LEVOTHYROXINE SODIUM 75 UG/1
75 TABLET ORAL
Qty: 90 TABLET | Refills: 0 | Status: SHIPPED | OUTPATIENT
Start: 2025-02-10

## 2025-02-10 RX ADMIN — LIDOCAINE HYDROCHLORIDE 0.5 ML: 10 INJECTION, SOLUTION INFILTRATION; PERINEURAL at 15:00

## 2025-02-10 RX ADMIN — TRIAMCINOLONE ACETONIDE 20 MG: 40 INJECTION, SUSPENSION INTRA-ARTICULAR; INTRAMUSCULAR at 15:00

## 2025-02-10 NOTE — PROGRESS NOTES
ORTHOPAEDIC HAND, WRIST, AND ELBOW OFFICE  VISIT     Name: Grace Diaz      : 1960      MRN: [id number]  Encounter Provider: Jason Ta MD  Encounter Date: 2/10/2025   Encounter department: [facility]  :  Assessment & Plan  Primary osteoarthritis of both first carpometacarpal joints  Discussed prognosis of condition with patient.   Patient given bilateral thumb CMC cortisone injections. Patient tolerated the procedure well. Can use cold compress as needed over the next 24 hours.   Discussed cortisone injections can be repeated every 3 months.  Follow up in 3 months.  Orders:  •  Small joint arthrocentesis: bilateral thumb CMC  •  lidocaine (XYLOCAINE) 1 % injection 0.5 mL  •  lidocaine (XYLOCAINE) 1 % injection 0.5 mL  •  triamcinolone acetonide (Kenalog-40) 40 mg/mL injection 20 mg  •  triamcinolone acetonide (Kenalog-40) 40 mg/mL injection 20 mg          General Discussions:  CMC Arthritis: The anatomy and physiology of carpometacarpal joint arthritis was discussed with the patient today in the office.  Deterioration of the articular cartilage eventually leads to hypermobility at the thumb CMC joint, resulting in joint subluxation, osteophyte formation, cystic changes within the trapezium and base of the first metacarpal, as well as subchondral sclerosis.  Eventually, pain, limited mobility, and compensatory hyperextension at the metacarpophalangeal joint may develop.  While normal activity and usage of the thumb joint may provide a painful experience to the patient, this typically does not result in damage to the thumb or hand.  Treatment options include resting thumb spica splints to decreased joint edema, pain, and inflammation.  Therapy exercises to strengthen the thenar musculature may relieve pain, but do not alter the overall continued development of osteoarthritis.  Oral medications, topical medications, corticosteroid injections may  "decrease pain and increase overall function.  Eventually, approximately 5% of patients may require surgical intervention.       Operative Discussions:       History of Present Illness   HPI  Chief Complaint   Patient presents with   • Right Thumb - Follow-up     CMC- Beta   • Left Thumb - Follow-up     CMC- No injections        Grace Diaz is a 65 y.o. female who presents today for follow up for bilateral thumb CMC arthritis. She reports having good symptom relief from the her cortisone injection for the right thumb CMC, but notes an increase in her pain during significant cold weather in January. She reports having pain in the left thumb CMC. She decided at her last visit to defer the cortisone injection for the left thumb. She is here today for a repeat injection for the right thumb and a first injection in the left thumb.      REVIEW OF SYSTEMS:  General: no fever, no chills  HEENT:  No loss of hearing or eyesight problems  Eyes:  No red eyes  Respiratory:  No coughing, shortness of breath or wheezing  Cardiovascular:  No chest pain, no palpitations  GI:  Abdomen soft nontender, denies nausea  Endocrine:  No muscle weakness, no frequent urination, no excessive thirst  Urinary:  No dysuria, no incontinence  Musculoskeletal: see HPI and PE  SKIN:  No skin rash, no dry skin  Neurological:  No headaches, no confusion  Psychiatric:  No suicide thoughts, no anxiety, no depression  Review of all other systems is negative    Objective   Ht 5' 2\" (1.575 m)   Wt 79.7 kg (175 lb 12.8 oz)   BMI 32.15 kg/m²      General: well developed and well nourished, alert, oriented times 3, and appears comfortable  Psychiatric: Normal  HEENT: Trachea Midline, No torticollis  Cardiovascular: No discernable arrhythmia  Pulmonary: No wheezing or stridor  Abdomen: No rebound or guarding  Extremities: No peripheral edema  Skin: No masses, erythema, lacerations, fluctation, ulcerations  Neurovascular: Sensation Intact to the " Median, Ulnar, Radial Nerve, Motor Intact to the Median, Ulnar, Radial Nerve, and Pulses Intact    Musculoskeletal exam:  LEFT SIDE:  CMC: No Tenderness to STT, Positive Shoulder Sign, No Instability, Positive grind, and grossly positive tendnerness CMC. 110-degrees hyperextension.  RIGHT SIDE:  CMC: Negative Grind, No Tenderness to STT, Positive Shoulder Sign, No Instability, and grossly positive tendnerness CMC. 110-degrees hyperextension.  110 hyperextension  Positive shoulder sign    STUDIES REVIEWED:  No Studies to review      PROCEDURES PERFORMED:  Small joint arthrocentesis: bilateral thumb CMC  Universal Protocol:  Consent: Verbal consent obtained.  Risks and benefits: risks, benefits and alternatives were discussed  Consent given by: patient  Patient understanding: patient states understanding of the procedure being performed  Patient identity confirmed: verbally with patient  Supporting Documentation  Indications: pain   Procedure Details  Location: thumb - bilateral thumb CMC  Needle size: 25 G  Ultrasound guidance: no  Approach: lateral    Medications (Right): 0.5 mL lidocaine 1 %; 20 mg triamcinolone acetonide 40 mg/mLMedications (Left): 0.5 mL lidocaine 1 %; 20 mg triamcinolone acetonide 40 mg/mL   Patient tolerance: patient tolerated the procedure well with no immediate complications  Dressing:  Sterile dressing applied              Scribe Attestation    I,:  Benigno Tilley am acting as a scribe while in the presence of the attending physician.:       I,:  Jason Ta MD personally performed the services described in this documentation    as scribed in my presence.:

## 2025-02-11 RX ORDER — LIDOCAINE HYDROCHLORIDE 10 MG/ML
0.5 INJECTION, SOLUTION INFILTRATION; PERINEURAL
Status: COMPLETED | OUTPATIENT
Start: 2025-02-10 | End: 2025-02-10

## 2025-02-11 RX ORDER — TRIAMCINOLONE ACETONIDE 40 MG/ML
20 INJECTION, SUSPENSION INTRA-ARTICULAR; INTRAMUSCULAR
Status: COMPLETED | OUTPATIENT
Start: 2025-02-10 | End: 2025-02-10

## 2025-05-12 ENCOUNTER — OFFICE VISIT (OUTPATIENT)
Dept: FAMILY MEDICINE CLINIC | Facility: CLINIC | Age: 65
End: 2025-05-12
Payer: COMMERCIAL

## 2025-05-12 VITALS
OXYGEN SATURATION: 96 % | HEART RATE: 103 BPM | DIASTOLIC BLOOD PRESSURE: 58 MMHG | TEMPERATURE: 96.7 F | SYSTOLIC BLOOD PRESSURE: 102 MMHG | HEIGHT: 62 IN | BODY MASS INDEX: 31.1 KG/M2 | WEIGHT: 169 LBS

## 2025-05-12 DIAGNOSIS — E06.3 HYPOTHYROIDISM DUE TO HASHIMOTO'S THYROIDITIS: ICD-10-CM

## 2025-05-12 DIAGNOSIS — Z12.31 ENCOUNTER FOR SCREENING MAMMOGRAM FOR BREAST CANCER: ICD-10-CM

## 2025-05-12 DIAGNOSIS — S16.1XXA ACUTE STRAIN OF NECK MUSCLE, INITIAL ENCOUNTER: ICD-10-CM

## 2025-05-12 DIAGNOSIS — R29.818 SUSPECTED SLEEP APNEA: ICD-10-CM

## 2025-05-12 DIAGNOSIS — S06.0X0A CONCUSSION WITHOUT LOSS OF CONSCIOUSNESS, INITIAL ENCOUNTER: ICD-10-CM
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
## 2025-05-12 PROCEDURE — 99214 OFFICE O/P EST MOD 30 MIN: CPT | Performed by: FAMILY MEDICINE

## 2025-05-12 RX ORDER — LEVOTHYROXINE SODIUM 75 UG/1
75 TABLET ORAL
Qty: 100 TABLET | Refills: 3 | Status: SHIPPED | OUTPATIENT
Start: 2025-05-12

## 2025-05-12 NOTE — PROGRESS NOTES
Name: Grace Diaz      : 1960      MRN: [id number]  Encounter Provider: Amara Overton DO  Encounter Date: 2025   Encounter department: [facility]  :  Assessment & Plan  Injury due to fall, initial encounter  Concussive symptoms after hit to head and cervical strain. Symptoms improving.        Concussion without loss of consciousness, initial encounter  Improving symptoms. No history of concussion. Increase fluids, rest when tired. Ok to work from home for 1 week to avoid the commute 1 hour both ways. To ED if worsening symptoms, consider CT scan       Acute strain of neck muscle, initial encounter  Tizanidine at bedtime and heat 15min 3 times a day. Consider pt if symptoms not improving.     Orders:    tiZANidine (ZANAFLEX) 4 mg tablet; Take 1 tablet (4 mg total) by mouth daily at bedtime    Suspected sleep apnea  Recommended by ent after evaluation-  reports witnessed apnea.   Orders:    Ambulatory Referral to Sleep Medicine; Future    Hypothyroidism due to Hashimoto's thyroiditis  Check blood work, thyroid, continue levothyroxine 75mcg dailiy   Orders:    TSH, 3rd generation with Free T4 reflex; Future    T3, free; Future    T4, free; Future    levothyroxine 75 mcg tablet; Take 1 tablet (75 mcg total) by mouth daily in the early morning    Encounter for screening mammogram for breast cancer  Schedule mammogram  Orders:    Mammo screening bilateral w 3d and cad; Future          Depression Screening and Follow-up Plan: Patient was screened for depression during today's encounter. They screened negative with a PHQ-2 score of 0.        History of Present Illness   Pt is here for evaluation after a fall. Pt was up in [facility]. Saturday night was trying to get leg into pants and lost her balance and fell backwards hitting her head on the bathtub. Did not lose consciousness.no headaches. No history of concussive symptoms.  Noticed neck pain, stiffness the next day.  "Did not sleep much Saturday night. Felt off balance.   No headache but feels a sensation over the left back part of her scalp. No open areas  Had fuzzy feeling. Pt feeling better today.   Seen by endocrine- had evaluation-   Advised to see a Sleep doctor- states  noticed that she stops breathing during her sleep.        Neck Injury   Pertinent negatives include no fever, headaches or weakness.     Review of Systems   Constitutional: Negative.  Negative for fatigue and fever.   HENT: Negative.     Eyes: Negative.    Respiratory: Negative.  Negative for cough.    Cardiovascular: Negative.    Gastrointestinal: Negative.    Endocrine: Negative.    Genitourinary: Negative.    Musculoskeletal: Negative.    Skin: Negative.    Allergic/Immunologic: Negative.    Neurological:  Negative for speech difficulty, weakness, light-headedness and headaches.   Psychiatric/Behavioral:  Positive for decreased concentration.        Objective   /58 (BP Location: Left arm, Patient Position: Sitting, Cuff Size: Large)   Pulse 103   Temp (!) 96.7 °F (35.9 °C) (Tympanic)   Ht 5' 2\" (1.575 m)   Wt 76.7 kg (169 lb)   SpO2 96%   BMI 30.91 kg/m²      Physical Exam  Vitals and nursing note reviewed.   Constitutional:       Appearance: She is well-developed.   HENT:      Head: Normocephalic and atraumatic.   Cardiovascular:      Rate and Rhythm: Normal rate and regular rhythm.      Heart sounds: Normal heart sounds.   Pulmonary:      Effort: Pulmonary effort is normal.      Breath sounds: Normal breath sounds.   Abdominal:      General: Bowel sounds are normal.      Palpations: Abdomen is soft.   Musculoskeletal:         General: Tenderness and signs of injury present.      Comments: Increased tension paraspinal cervical- right greater than left   Skin:     General: Skin is warm and dry.   Neurological:      Mental Status: She is alert and oriented to person, place, and time.   Psychiatric:         Behavior: Behavior normal.    "      Thought Content: Thought content normal.         Judgment: Judgment normal.

## 2025-05-12 NOTE — PATIENT INSTRUCTIONS
"[facility] work at [facility]- not fasting  Schedule sleep medicine   Heat 15-20min 3 times a day to neck  Tizanidine at night as needed   Patient Education   Patient Education     Concussion in adults   The Basics   Written by the doctors and editors at [facility]   What is a concussion? -- A \"concussion\" is the medical term for a mild brain injury. It can cause confusion, memory loss, and headache.  A concussion usually happens after a person hits their head. But in some cases, it can happen after an injury or accident that causes violent shaking of the head.  Common causes of concussion include:   Car accidents   Falling down and other accidents that can happen from daily activities   Injuries from playing sports such as football, ice hockey, soccer, and boxing   Injuries that can happen to soldiers during combat. These include injuries from blasts and bullet wounds.  What are the symptoms of a concussion? -- People used to think that \"passing out\" or \"blacking out\" was an important feature of a concussion. But it is actually common to have a concussion without blacking out.  Symptoms that can happen immediately, or in the first minutes to hours after a concussion, include:   Memory loss - People sometimes forget what caused their injury, as well as what happened right before and after the injury.   Confusion   Headache   Dizziness or trouble with balance   Nausea or vomiting  Some people recover quickly from a concussion and have no further symptoms. But others have symptoms that persist or happen hours to days after a concussion. These might include:   Trouble walking or talking   Memory problems or problems paying attention   Trouble sleeping or feeling very tired or sleepy   Mood or behavior changes (for example, acting cranky, irritable, or not like themselves)   Being bothered by things like noise or light  Will I need tests? -- It depends on your injury and symptoms. To check if you have a concussion, your doctor will " "ask about your symptoms and do a physical exam. They will also ask you questions to check that you are thinking clearly.  If your doctor suspects a serious injury, they might order an imaging test of the brain, such as a CT or MRI scan. These tests create pictures of the skull and inside of the brain.  How is a concussion treated? -- If you think that you are having symptoms related to a head injury or concussion, see a doctor or nurse. This might be your regular doctor, or they might refer you to a different doctor.  Treatment of a concussion involves:   Preventing further injury - While you are healing, it's important not to do too much, especially activities that could lead to another head injury, like organized sports. Having a second injury while the brain is healing from a concussion can seriously damage the brain.   Physical rest - Rest your body, and get plenty of sleep. Avoid heavy exercise or too much physical activity if it makes you feel worse.   Mental rest - Doctors also call this \"cognitive rest.\" Avoid doing activities that need concentration or a lot of attention if they make you feel worse. Sometimes, activities using screens, especially videogames, can make people feel worse after a concussion. You can start doing these things again as you get better.   Avoiding alcohol and cannabis while you are still having symptoms of concussion   Treating headache - You can take an over-the-counter pain reliever if you have a headache. These include acetaminophen (sample brand name: Tylenol) and NSAIDs such as ibuprofen (sample brand names: Advil, Motrin) and naproxen (sample brand name: Aleve).  Most concussions get better on their own, but it can take time. For some people, the symptoms go away within minutes to hours. Other people have symptoms for weeks to months. When symptoms last a long time, doctors call it \"postconcussion syndrome.\"  When should I call for help? -- After a concussion, your doctor " might recommend that someone stay with you for 12 to 24 hours. This person should watch for any new symptoms.  Someone should call for an ambulance (in the [place] and [place], call 9-1-1) if you:   Cannot be fully woken up   Are acting confused or disoriented   Have a sudden and persistent change in your behavior   Cannot walk normally   Have trouble speaking or slurred speech   Have severe weakness or cannot move an arm, leg, or 1 side of your face   Have a seizure or jerking of your arms or legs you cannot control  Someone should call the doctor or nurse for advice if you:   Have concussion symptoms that are not improving or are getting worse, even with physical and mental rest   Have blood or clear liquid draining from your ears or nose   Seem weak or have numbness in an arm, leg, or other body part   Have a stiff neck   Have a headache that is severe, gets worse, feels different, or does not get better with over-the-counter medicines  If any of the above symptoms seem severe, or if you are concerned but cannot reach the doctor or nurse, seek emergency help. These things don't always mean that there is a serious problem, but seeing a doctor or nurse is the only way to know for sure.  When can I play sports or do my usual activities again? -- Ask your doctor when you can play sports or do your usual activities again. It will depend on your injury and symptoms, as well as the type of sport you play. Do not play sports on the same day as your injury.  When you are able to return to work also depends on your symptoms and the type of work you do.  It's important to let your brain heal completely after a concussion. Getting another concussion before your brain has healed can lead to serious brain problems.  How can I prevent another concussion? -- To help prevent another concussion, you can:   Wear a helmet when you ride a bike or motorcycle, or play certain sports.   Wear a seat belt when you drive or ride in a car.  If  you have had a concussion, it's very important to try to prevent future concussions. Having many concussions might cause long-term brain damage and affect your thinking.  All topics are updated as new evidence becomes available and our peer review process is complete.  This topic retrieved from [employer] on: Feb 26, 2024.  Topic 15549 Version 11.0  Release: 32.2.4 - C32.56  © 2024 UpToDate, Inc. and/or its affiliates. All rights reserved.  Consumer Information Use and Disclaimer   Disclaimer: This generalized information is a limited summary of diagnosis, treatment, and/or medication information. It is not meant to be comprehensive and should be used as a tool to help the user understand and/or assess potential diagnostic and treatment options. It does NOT include all information about conditions, treatments, medications, side effects, or risks that may apply to a specific patient. It is not intended to be medical advice or a substitute for the medical advice, diagnosis, or treatment of a health care provider based on the health care provider's examination and assessment of a patient's specific and unique circumstances. Patients must speak with a health care provider for complete information about their health, medical questions, and treatment options, including any risks or benefits regarding use of medications. This information does not endorse any treatments or medications as safe, effective, or approved for treating a specific patient. Up[employer]Date, Inc[employer] and its affiliates disclaim any warranty or liability relating to this information or the use thereof.The use of this information is governed by the Terms of Use, available at https://www.wolterskluwer.com/en/know/clinical-effectiveness-terms. 2024© UpToDate, Inc. and its affiliates and/or licensors. All rights reserved.  Copyright   © 2024 Up[employer]Date, Inc. and/or its affiliates. All rights reserved.    Neck Stretches   About this topic   Stretching is a kind of  exercise. When you stretch, you make a specific muscle or group of muscles longer. Stretching is good for you. It increases blood flow to a muscle. This can help get your muscles ready for other exercises. Stretching can also help you relax and may keep you from hurting your muscles.  If you have neck problems, doing these exercises could make your problem worse.  General   Before starting with a program, ask your doctor if you are healthy enough to do these exercises. Your doctor may have you work with a  or physical therapist to make a safe exercise program to meet your needs.  Stretching Exercises   Stretching exercises keep your muscles flexible. They also stop them from getting tight. Start by doing each of these stretches 2 to 3 times. In order for your body to make changes, you will need to hold these stretches for 20 to 30 seconds. Try to do the stretches 2 to 3 times each day. Do all exercises slowly.  If you have balance problems, do not try standing stretches. There are other safer ways to stretch different muscles while sitting or lying down.  Passive neck stretches ? Put your left hand on top of your head. Your other arm can be at your side or behind your back. Pull your head toward your left shoulder until you feel a gentle stretch on the right side of your neck. Repeat on the other side using your other hand. Also, try this stretch by pulling in a diagonal direction. With your left hand on top of your head, pull your head down towards the direction of your left knee. You should feel this stretch towards the back on the right side of your neck. Repeat on the other side.  Active neck stretches:  Neck front-to-back motion ? Look down to the floor and then up at the ceiling.  Neck side-to-side motion ? Tilt your head to the side and bring your ear to your shoulder. Now, tilt your head to the other side.  Neck turning ? Turn only your head and look over your left shoulder. Now turn only your head  "and look over your right shoulder.  Corner stretches:  T position ? Stand about one foot away from a corner. Bend your elbows and bring your upper arms to shoulder height. Rest your arms on the wall. Keep your back straight and gently lean forward until you feel a stretch in the front of your chest and shoulders.  V position ? Stand about one foot away from a corner. With your elbows straight, put only your hands on the wall and make a letter \"V\". Keep your back straight and gently lean forward until you feel a stretch in the front of your chest and shoulders.  Shoulder circles ? Sit with your back straight. Raise just your shoulders up towards your ears. Move them back, down, and then forward in a [place]. Repeat, moving the shoulders in a [place] going forward.  Chin tucks ? Stand straight or lie down on your back. Tuck your chin in and lengthen the back of your neck. Return to the starting position and repeat. It may help to stand up against a wall during this exercise. Try gently pushing your chin with two fingers while trying to flatten your neck against the wall. If you do this exercise lying down, try using a small rolled up washcloth under your neck. Push down into the washcloth when tucking in your chin.             What will the results be?   Prevent injury  Improve flexibility  Improve motion  Improve body posture  Lower stress  Reduce pain  Helpful tips   Stay active and work out to keep your muscles strong and flexible.  Keep a healthy weight so there is not extra stress on your joints. Eat a healthy diet to keep your muscles healthy.  Be sure you do not hold your breath when exercising. This can raise your blood pressure. If you tend to hold your breath, try counting out loud when exercising. If any exercise bothers you, stop right away.  Always warm up before stretching. Heated muscles stretch much easier than cool muscles. Stretching cool muscles can lead to injury.  Try walking and swinging your arms " at an easy pace for a few minutes to warm up your muscles. Do this again after exercising.  Never bounce when doing stretches.  Doing exercises before a meal may be a good way to get into a routine.  Exercise may be slightly uncomfortable, but you should not have sharp pains. If you do get sharp pains, stop what you are doing. If the sharp pains continue, call your doctor.  Last Reviewed Date   2021-08-16  Consumer Information Use and Disclaimer   This generalized information is a limited summary of diagnosis, treatment, and/or medication information. It is not meant to be comprehensive and should be used as a tool to help the user understand and/or assess potential diagnostic and treatment options. It does NOT include all information about conditions, treatments, medications, side effects, or risks that may apply to a specific patient. It is not intended to be medical advice or a substitute for the medical advice, diagnosis, or treatment of a health care provider based on the health care provider's examination and assessment of a patient’s specific and unique circumstances. Patients must speak with a health care provider for complete information about their health, medical questions, and treatment options, including any risks or benefits regarding use of medications. This information does not endorse any treatments or medications as safe, effective, or approved for treating a specific patient. Up[employer], Inc[employer] and its affiliates disclaim any warranty or liability relating to this information or the use thereof. The use of this information is governed by the Terms of Use, available at https://www.woltersmedineeringuwer.com/en/know/clinical-effectiveness-terms   Copyright   Copyright © 2024 UpToDate, Inc. and its affiliates and/or licensors. All rights reserved.

## 2025-05-12 NOTE — ASSESSMENT & PLAN NOTE
Tizanidine at bedtime and heat 15min 3 times a day. Consider pt if symptoms not improving.     Orders:    tiZANidine (ZANAFLEX) 4 mg tablet; Take 1 tablet (4 mg total) by mouth daily at bedtime

## 2025-05-12 NOTE — ASSESSMENT & PLAN NOTE
Improving symptoms. No history of concussion. Increase fluids, rest when tired. Ok to work from home for 1 week to avoid the commute 1 hour both ways. To ED if worsening symptoms, consider CT scan

## 2025-05-12 NOTE — ASSESSMENT & PLAN NOTE
Check blood work, thyroid, continue levothyroxine 75mcg dailiy   Orders:    TSH, 3rd generation with Free T4 reflex; Future    T3, free; Future    T4, free; Future    levothyroxine 75 mcg tablet; Take 1 tablet (75 mcg total) by mouth daily in the early morning

## 2025-05-12 NOTE — ASSESSMENT & PLAN NOTE
Recommended by ent after evaluation-  reports witnessed apnea.   Orders:    Ambulatory Referral to Sleep Medicine; Future

## 2025-05-19 ENCOUNTER — OFFICE VISIT (OUTPATIENT)
Dept: OBGYN CLINIC | Facility: CLINIC | Age: 65
End: 2025-05-19
Payer: COMMERCIAL

## 2025-05-19 VITALS — WEIGHT: 169 LBS | BODY MASS INDEX: 31.1 KG/M2 | HEIGHT: 62 IN

## 2025-05-19 DIAGNOSIS — M18.0 PRIMARY OSTEOARTHRITIS OF BOTH FIRST CARPOMETACARPAL JOINTS: Primary | ICD-10-CM

## 2025-05-19 PROCEDURE — 20600 DRAIN/INJ JOINT/BURSA W/O US: CPT | Performed by: ORTHOPAEDIC SURGERY

## 2025-05-19 PROCEDURE — 99213 OFFICE O/P EST LOW 20 MIN: CPT | Performed by: ORTHOPAEDIC SURGERY

## 2025-05-19 RX ORDER — TRIAMCINOLONE ACETONIDE 40 MG/ML
20 INJECTION, SUSPENSION INTRA-ARTICULAR; INTRAMUSCULAR
Status: COMPLETED | OUTPATIENT
Start: 2025-05-19 | End: 2025-05-19

## 2025-05-19 RX ORDER — BUPIVACAINE HYDROCHLORIDE 2.5 MG/ML
0.5 INJECTION, SOLUTION INFILTRATION; PERINEURAL
Status: COMPLETED | OUTPATIENT
Start: 2025-05-19 | End: 2025-05-19

## 2025-05-19 RX ADMIN — TRIAMCINOLONE ACETONIDE 20 MG: 40 INJECTION, SUSPENSION INTRA-ARTICULAR; INTRAMUSCULAR at 15:00

## 2025-05-19 RX ADMIN — BUPIVACAINE HYDROCHLORIDE 0.5 ML: 2.5 INJECTION, SOLUTION INFILTRATION; PERINEURAL at 15:00

## 2025-05-19 NOTE — PROGRESS NOTES
ORTHOPAEDIC HAND, WRIST, AND ELBOW OFFICE  VISIT     Name: Grace Diaz      : 1960      MRN: [id number]  Encounter Provider: Jason Ta MD  Encounter Date: 2025   Encounter department: [facility]  :  Assessment & Plan  Primary osteoarthritis of both first carpometacarpal joints  Right thumb CMC CSI using Kenalog was performed in the office without complication.   Post injection instructions were reviewed.   The CSI may be repeated on a 3 month basis as needed.   Continue the use of the comfort cool brace as needed.   Follow up in 3 months time for clinical re-evaluation.   Orders:  •  Small joint arthrocentesis: R thumb CMC  •  bupivacaine (MARCAINE) 0.25 % injection 0.5 mL  •  triamcinolone acetonide (Kenalog-40) 40 mg/mL injection 20 mg        History of Present Illness   HPI  Chief Complaint   Patient presents with   • Right Thumb - Follow-up     CMC- Kenalog    • Left Thumb - Follow-up     Thumb CMC- Kenalog         Grace Diaz is a 65 y.o. female who presents to the office for a follow up regarding bilateral thumb CMC arthritis. She underwent bilateral thumb CMC CSI's on 2/10/25, which were beneficial for her. She notes pain on the left has resolved. She notes pain to the base of her right thumb started to return approx. 1-2 weeks ago. She notes her hand locked on her when she was driving a long distance to [place]. She does have a brace that she will wear on the right side as needed. She will also take OTC analgesics as needed for pain control.       REVIEW OF SYSTEMS:  General: no fever, no chills  HEENT:  No loss of hearing or eyesight problems  Eyes:  No red eyes  Respiratory:  No coughing, shortness of breath or wheezing  Cardiovascular:  No chest pain, no palpitations  GI:  Abdomen soft nontender, denies nausea  Endocrine:  No muscle weakness, no frequent urination, no excessive thirst  Urinary:  No dysuria, no  "incontinence  Musculoskeletal: see HPI and PE  SKIN:  No skin rash, no dry skin  Neurological:  No headaches, no confusion  Psychiatric:  No suicide thoughts, no anxiety, no depression  Review of all other systems is negative    Objective   Ht 5' 2\" (1.575 m)   Wt 76.7 kg (169 lb)   BMI 30.91 kg/m²      General: well developed and well nourished, alert, oriented times 3, and appears comfortable  Psychiatric: Normal  HEENT: Trachea Midline, No torticollis  Cardiovascular: No discernable arrhythmia  Pulmonary: No wheezing or stridor  Abdomen: No rebound or guarding  Extremities: No peripheral edema  Skin: No masses, erythema, lacerations, fluctation, ulcerations  Neurovascular: Sensation Intact to the Median, Ulnar, Radial Nerve, Motor Intact to the Median, Ulnar, Radial Nerve, and Pulses Intact    Musculoskeletal exam:  RIGHT SIDE:  CMC: Positive Shoulder Sign, No Instability, Positive grind, Positive tendnerness CMC.       STUDIES REVIEWED:  No Studies to review      PROCEDURES PERFORMED:  Small joint arthrocentesis: R thumb CMC    Performed by: Jason Ta MD  Authorized by: Jason Ta MD    Universal Protocol:  procedure performed by consultantConsent: Verbal consent obtained. Written consent not obtained  Risks and benefits: risks, benefits and alternatives were discussed  Consent given by: patient  Patient understanding: patient states understanding of the procedure being performed  Site marked: the operative site was marked  Patient identity confirmed: verbally with patient  Supporting Documentation  Indications: pain   Procedure Details  Location: thumb - R thumb CMC  Needle size: 25 G  Ultrasound guidance: no  Medications administered: 0.5 mL bupivacaine 0.25 %; 20 mg triamcinolone acetonide 40 mg/mL    Patient tolerance: patient tolerated the procedure well with no immediate complications  Dressing:  Sterile dressing applied        -      Scribe Attestation    I,:  Jacquie Welch MA am " acting as a scribe while in the presence of the attending physician.:       I,:  Jason Ta MD personally performed the services described in this documentation    as scribed in my presence.:

## 2025-06-03 ENCOUNTER — OFFICE VISIT (OUTPATIENT)
Dept: SLEEP CENTER | Facility: CLINIC | Age: 65
End: 2025-06-03
Attending: FAMILY MEDICINE
Payer: COMMERCIAL

## 2025-06-03 VITALS
HEIGHT: 62 IN | BODY MASS INDEX: 31.1 KG/M2 | DIASTOLIC BLOOD PRESSURE: 86 MMHG | OXYGEN SATURATION: 95 % | SYSTOLIC BLOOD PRESSURE: 118 MMHG | HEART RATE: 90 BPM | WEIGHT: 169 LBS

## 2025-06-03 DIAGNOSIS — E06.3 HYPOTHYROIDISM DUE TO HASHIMOTO'S THYROIDITIS: ICD-10-CM

## 2025-06-03 DIAGNOSIS — G47.8 OTHER SLEEP DISORDERS: ICD-10-CM

## 2025-06-03 PROCEDURE — 99244 OFF/OP CNSLTJ NEW/EST MOD 40: CPT | Performed by: PSYCHIATRY & NEUROLOGY

## 2025-06-03 NOTE — PROGRESS NOTES
Name: Grace Diaz      : 1960      MRN: [id number]  Encounter Provider: Darwin Larry MD  Encounter Date: 6/3/2025   Encounter department: [facility] SLEEP MEDICINE [place]  :  Assessment & Plan  BMI 31.0-31.9,adult    Orders:  •  Home Sleep Study; Future    Other sleep disorders  Her symptoms of fatigue, decreased function, and unrefreshing sleep suggest a potential sleep disorder, possibly obstructive sleep apnea. Her body mass index is slightly elevated, and she has an overbite, both of which increase her risk for sleep apnea. A home sleep study will be ordered to investigate the possibility of sleep apnea. If the home sleep study is inconclusive, a follow-up study at our sleep center may be necessary.  Orders:  •  Home Sleep Study; Future    Hypothyroidism due to Hashimoto's thyroiditis  She reports ongoing symptoms despite normal thyroid function tests and medication levels. She has switched endocrinologists and is now seeing Dr. Novak, who has conducted additional blood work, all of which returned normal results.    She experiences persistent fatigue, which may be related to her hypothyroidism or a potential sleep disorder. She is advised to try to get more consistent sleep and consider sleeping in a different room to avoid disturbances from her 's snoring and phone calls.             History of Present Illness   History of Present Illness  The patient is a 65-year-old female who presents as a new patient referred by her primary care physician about 1 month ago. The note was reviewed and described that she has had witnessed breathing pauses by her  that prompted this referral. The patient has a medical history which includes hypothyroidism, chronic fatigue, and arthralgias.    She reports experiencing internal coldness, occurring sporadically, sometimes twice or four times a month, with no identifiable triggers. During these episodes, she feels extremely cold,  regardless of the number of blankets or layers of clothing she uses. Her hands also become notably cold to touch. She consulted Dr. Novak, an endocrinologist, who ruled out anemia and iron deficiency as potential causes. Dr. Novak suggested a consultation with a sleep specialist.    She acknowledges poor sleep quality, attributing it to various factors. 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 She works both in the office and from home during daytime hours. Her work starts at 9:30 AM. She typically goes to bed around 10:30 PM and has no difficulty falling asleep but often wakes up at least twice during the night due to her 's snoring or work-related calls. She occasionally struggles to return to sleep and will lay there awake. She has two geriatric dogs that also disrupt her sleep. She typically wakes up at 7:00 AM and sleeps about 6 hours a night. On weekends, she goes to bed a little bit later and sleeps in until about 8:30 AM. She occasionally experiences daytime fatigue but does not struggle to stay awake or feel the need to nap. She does not feel refreshed upon waking. She is unaware of any snoring or gasping during sleep, although her  reported hearing her gasp one night following her fall. This has not recurred since. She does not wake up with headaches or dry mouth and reports no sleepwalking. She occasionally experiences leg cramps due to knee arthritis and charley horse in her leg but does not have restless legs syndrome.    She also reports a decrease in stamina, feeling fatigued after visiting two stores, a task she previously could complete without issue. She is not short of breath. Her daily caffeine intake is limited to one cup of coffee in the morning, and she consumes alcohol only on weekends.    Supplemental Information  She had an accidental fall about a month ago while attending her son's college graduation in [place]. She fell  "backwards into the bathtub and hit her head on the wall, resulting in a mild concussion and neck injury. She was on muscle relaxers for 10 days but discontinued them as they did not seem to improve her condition. She reports no residual symptoms from the concussion.    SOCIAL HISTORY  She drinks 1 cup of coffee a day in the morning. She drinks a glass of wine maybe on the weekend.    MEDICATIONS  Current: Levothyroxine.  Discontinued: Tizanidine.             Sitting and reading: (Patient-Rptd) Slight chance of dozing  Watching TV: (Patient-Rptd) Slight chance of dozing  Sitting, inactive in a public place (e.g. a theatre or a meeting): (Patient-Rptd) Would never doze  As a passenger in a car for an hour without a break: (Patient-Rptd) Would never doze  Lying down to rest in the afternoon when circumstances permit: (Patient-Rptd) Would never doze  Sitting and talking to someone: (Patient-Rptd) Would never doze  Sitting quietly after a lunch without alcohol: (Patient-Rptd) Would never doze  In a car, while stopped for a few minutes in traffic: (Patient-Rptd) Would never doze  Total score: (Patient-Rptd) 2     Review of Systems  Pertinent positives/negatives included in HPI and also as noted:       Objective   /86 (BP Location: Left arm, Patient Position: Sitting, Cuff Size: Standard)   Pulse 90   Ht 5' 2\" (1.575 m)   Wt 76.7 kg (169 lb)   SpO2 95%   BMI 30.91 kg/m²     Neck Circumference: 15  Physical Exam  Physical Exam    Visit Vitals  /86 (BP Location: Left arm, Patient Position: Sitting, Cuff Size: Standard)   Pulse 90   Ht 5' 2\" (1.575 m)   Wt 76.7 kg (169 lb)   SpO2 95%   BMI 30.91 kg/m²   OB Status Postmenopausal   Smoking Status Never   BSA 1.78 m²       Mallampati/Candaa Grade : Mallampati 4                      Dental structure : Overbite    Appearance : no distress, alert, and cooperative  MENTAL STATUS : alert and oriented, normal affect, makes good eye contact, provides a detailed " "history  Heart exam : regular rate and rhythm, S1, S2 normal, no murmur, click, rub or gallop  clear to auscultation bilaterally        Results  Laboratory Studies  Thyroid test and all other blood tests came back normal. Iron levels were normal.  Data  Lab Results   Component Value Date    HGB 14.1 10/21/2024    HCT 44.0 10/21/2024    MCV 95.9 10/21/2024      Lab Results   Component Value Date    CALCIUM 9.5 10/21/2024    K 4.1 10/21/2024    CO2 26 10/21/2024     10/21/2024    BUN 13 10/21/2024    CREATININE 0.68 10/21/2024     No results found for: \"IRON\", \"TIBC\", \"FERRITIN\"  Lab Results   Component Value Date    AST 17 10/21/2024    ALT 15 10/21/2024         "

## 2025-06-05 NOTE — ASSESSMENT & PLAN NOTE
She reports ongoing symptoms despite normal thyroid function tests and medication levels. She has switched endocrinologists and is now seeing Dr. Novak, who has conducted additional blood work, all of which returned normal results.    She experiences persistent fatigue, which may be related to her hypothyroidism or a potential sleep disorder. She is advised to try to get more consistent sleep and consider sleeping in a different room to avoid disturbances from her 's snoring and phone calls.

## 2025-07-01 ENCOUNTER — HOSPITAL ENCOUNTER (OUTPATIENT)
Facility: HOSPITAL | Age: 65
Discharge: HOME | End: 2025-07-01
Attending: PSYCHIATRY & NEUROLOGY
Payer: COMMERCIAL

## 2025-07-01 DIAGNOSIS — G47.8 OTHER SLEEP DISORDERS: ICD-10-CM

## 2025-07-01 PROCEDURE — G0399 HOME SLEEP TEST/TYPE 3 PORTA: HCPCS

## 2025-07-01 NOTE — PROGRESS NOTES
Home Sleep Study Documentation    HOME STUDY DEVICE: Noxturnal no                                           Alissa G3 yes device # 5      Pre-Sleep Home Study:    Set-up and instructions performed by: Lana    Technician performed demonstration for Patient: yes    Return demonstration performed by Patient: yes    Written instructions provided to Patient: yes    Patient signed consent form: yes          Post-Sleep Home Study:    Post Test Questionnaire Data: Pending    Additional comments by Patient: pending    Home Sleep Study Failed:pending    Failure reason: pending    Reported or Detected: pending    Scored by: pending

## 2025-07-11 PROBLEM — G47.33 OSA (OBSTRUCTIVE SLEEP APNEA): Status: ACTIVE | Noted: 2025-07-11

## 2025-07-17 ENCOUNTER — TELEPHONE (OUTPATIENT)
Dept: SLEEP CENTER | Facility: CLINIC | Age: 65
End: 2025-07-17

## 2025-07-17 ENCOUNTER — RESULTS FOLLOW-UP (OUTPATIENT)
Dept: SLEEP CENTER | Facility: HOSPITAL | Age: 65
End: 2025-07-17

## 2025-07-17 DIAGNOSIS — G47.33 OSA (OBSTRUCTIVE SLEEP APNEA): Primary | ICD-10-CM

## 2025-07-17 LAB

## 2025-07-17 NOTE — TELEPHONE ENCOUNTER
Home sleep study read and shows severe sleep apnea worse in supine position with event related desaturations. DIANE 31.2. CPAP ordered with stat set up.     Called patient, reviewed results and recommendation for CPAP.     [facility] chosen as DME provider, phone number given.     Compliance appointment with Dr. Larry scheduled for 10/6/2025 @ 3 Pm in the [place] office.     CPAP script and other clinicals sent to [facility] via [email or web address] with request to expedite set up.     Email to [facility] management team to help with stat set up for patient.

## 2025-07-17 NOTE — TELEPHONE ENCOUNTER
Incoming call from patient looking for sleep study results.      Study not resulted yet. Homes sleep study done 7/1/2025.  Informed patient it can take up to 2 weeks and that she will receive a notification from [employer] that she has new test results once it is read.      Patient states she is losing her insurance company at the end of July and is anxious to have results in case treatment is needed.     Encourage patient to call back at that time with questions/concerns. And advised Nurses will reach out after study resulted.     Email to Dr. Larry to inform of the same.

## 2025-07-29 LAB

## 2025-08-06 LAB

## 2025-08-15 ENCOUNTER — TELEPHONE (OUTPATIENT)
Dept: SLEEP CENTER | Facility: CLINIC | Age: 65
End: 2025-08-15

## 2025-08-15 LAB
